# Patient Record
Sex: FEMALE | Race: WHITE | NOT HISPANIC OR LATINO
[De-identification: names, ages, dates, MRNs, and addresses within clinical notes are randomized per-mention and may not be internally consistent; named-entity substitution may affect disease eponyms.]

---

## 2019-12-06 ENCOUNTER — RESULT REVIEW (OUTPATIENT)
Age: 29
End: 2019-12-06

## 2021-08-17 ENCOUNTER — APPOINTMENT (OUTPATIENT)
Dept: ANTEPARTUM | Facility: CLINIC | Age: 31
End: 2021-08-17
Payer: COMMERCIAL

## 2021-08-17 ENCOUNTER — ASOB RESULT (OUTPATIENT)
Age: 31
End: 2021-08-17

## 2021-08-17 PROCEDURE — 76801 OB US < 14 WKS SINGLE FETUS: CPT

## 2021-08-17 PROCEDURE — 76813 OB US NUCHAL MEAS 1 GEST: CPT

## 2021-10-11 ENCOUNTER — APPOINTMENT (OUTPATIENT)
Dept: ANTEPARTUM | Facility: CLINIC | Age: 31
End: 2021-10-11
Payer: COMMERCIAL

## 2021-10-11 ENCOUNTER — ASOB RESULT (OUTPATIENT)
Age: 31
End: 2021-10-11

## 2021-10-11 PROCEDURE — 76817 TRANSVAGINAL US OBSTETRIC: CPT | Mod: 59

## 2021-10-11 PROCEDURE — 76805 OB US >/= 14 WKS SNGL FETUS: CPT

## 2021-10-22 ENCOUNTER — ASOB RESULT (OUTPATIENT)
Age: 31
End: 2021-10-22

## 2021-10-22 ENCOUNTER — APPOINTMENT (OUTPATIENT)
Dept: ANTEPARTUM | Facility: CLINIC | Age: 31
End: 2021-10-22
Payer: COMMERCIAL

## 2021-10-22 PROCEDURE — 76816 OB US FOLLOW-UP PER FETUS: CPT

## 2021-12-28 ENCOUNTER — ASOB RESULT (OUTPATIENT)
Age: 31
End: 2021-12-28

## 2021-12-28 ENCOUNTER — APPOINTMENT (OUTPATIENT)
Dept: ANTEPARTUM | Facility: CLINIC | Age: 31
End: 2021-12-28
Payer: COMMERCIAL

## 2021-12-28 PROCEDURE — 76819 FETAL BIOPHYS PROFIL W/O NST: CPT

## 2021-12-28 PROCEDURE — 76816 OB US FOLLOW-UP PER FETUS: CPT

## 2022-03-01 ENCOUNTER — INPATIENT (INPATIENT)
Facility: HOSPITAL | Age: 32
LOS: 3 days | Discharge: HOME | End: 2022-03-05
Attending: SPECIALIST | Admitting: SPECIALIST
Payer: COMMERCIAL

## 2022-03-01 ENCOUNTER — RESULT REVIEW (OUTPATIENT)
Age: 32
End: 2022-03-01

## 2022-03-01 VITALS — HEIGHT: 64 IN | WEIGHT: 147.05 LBS

## 2022-03-01 DIAGNOSIS — O26.899 OTHER SPECIFIED PREGNANCY RELATED CONDITIONS, UNSPECIFIED TRIMESTER: ICD-10-CM

## 2022-03-01 DIAGNOSIS — Z3A.00 WEEKS OF GESTATION OF PREGNANCY NOT SPECIFIED: ICD-10-CM

## 2022-03-01 LAB
ALBUMIN SERPL ELPH-MCNC: 3.6 G/DL — SIGNIFICANT CHANGE UP (ref 3.3–5)
ALP SERPL-CCNC: 298 U/L — HIGH (ref 40–120)
ALT FLD-CCNC: 13 U/L — SIGNIFICANT CHANGE UP (ref 10–45)
ANION GAP SERPL CALC-SCNC: 15 MMOL/L — SIGNIFICANT CHANGE UP (ref 5–17)
APTT BLD: 25.2 SEC — LOW (ref 27.5–35.5)
AST SERPL-CCNC: 22 U/L — SIGNIFICANT CHANGE UP (ref 10–40)
BASOPHILS # BLD AUTO: 0.02 K/UL — SIGNIFICANT CHANGE UP (ref 0–0.2)
BASOPHILS NFR BLD AUTO: 0.2 % — SIGNIFICANT CHANGE UP (ref 0–2)
BILIRUB SERPL-MCNC: 0.2 MG/DL — SIGNIFICANT CHANGE UP (ref 0.2–1.2)
BUN SERPL-MCNC: 8 MG/DL — SIGNIFICANT CHANGE UP (ref 7–23)
CALCIUM SERPL-MCNC: 9.7 MG/DL — SIGNIFICANT CHANGE UP (ref 8.4–10.5)
CHLORIDE SERPL-SCNC: 102 MMOL/L — SIGNIFICANT CHANGE UP (ref 96–108)
CO2 SERPL-SCNC: 17 MMOL/L — LOW (ref 22–31)
COVID-19 SPIKE DOMAIN AB INTERP: POSITIVE
COVID-19 SPIKE DOMAIN ANTIBODY RESULT: >250 U/ML — HIGH
CREAT ?TM UR-MCNC: 32 MG/DL — SIGNIFICANT CHANGE UP
CREAT SERPL-MCNC: 0.69 MG/DL — SIGNIFICANT CHANGE UP (ref 0.5–1.3)
EGFR: 117 ML/MIN/1.73M2 — SIGNIFICANT CHANGE UP
EOSINOPHIL # BLD AUTO: 0.02 K/UL — SIGNIFICANT CHANGE UP (ref 0–0.5)
EOSINOPHIL NFR BLD AUTO: 0.2 % — SIGNIFICANT CHANGE UP (ref 0–6)
FIBRINOGEN PPP-MCNC: 436 MG/DL — SIGNIFICANT CHANGE UP (ref 258–438)
GLUCOSE SERPL-MCNC: 80 MG/DL — SIGNIFICANT CHANGE UP (ref 70–99)
HCT VFR BLD CALC: 38.9 % — SIGNIFICANT CHANGE UP (ref 34.5–45)
HGB BLD-MCNC: 13.3 G/DL — SIGNIFICANT CHANGE UP (ref 11.5–15.5)
IMM GRANULOCYTES NFR BLD AUTO: 0.3 % — SIGNIFICANT CHANGE UP (ref 0–1.5)
INR BLD: 0.83 — LOW (ref 0.88–1.16)
LDH SERPL L TO P-CCNC: 241 U/L — SIGNIFICANT CHANGE UP (ref 50–242)
LYMPHOCYTES # BLD AUTO: 1.47 K/UL — SIGNIFICANT CHANGE UP (ref 1–3.3)
LYMPHOCYTES # BLD AUTO: 14.7 % — SIGNIFICANT CHANGE UP (ref 13–44)
MCHC RBC-ENTMCNC: 31.6 PG — SIGNIFICANT CHANGE UP (ref 27–34)
MCHC RBC-ENTMCNC: 34.2 GM/DL — SIGNIFICANT CHANGE UP (ref 32–36)
MCV RBC AUTO: 92.4 FL — SIGNIFICANT CHANGE UP (ref 80–100)
MONOCYTES # BLD AUTO: 0.41 K/UL — SIGNIFICANT CHANGE UP (ref 0–0.9)
MONOCYTES NFR BLD AUTO: 4.1 % — SIGNIFICANT CHANGE UP (ref 2–14)
NEUTROPHILS # BLD AUTO: 8.03 K/UL — HIGH (ref 1.8–7.4)
NEUTROPHILS NFR BLD AUTO: 80.5 % — HIGH (ref 43–77)
NRBC # BLD: 0 /100 WBCS — SIGNIFICANT CHANGE UP (ref 0–0)
PLATELET # BLD AUTO: 213 K/UL — SIGNIFICANT CHANGE UP (ref 150–400)
POTASSIUM SERPL-MCNC: 4.1 MMOL/L — SIGNIFICANT CHANGE UP (ref 3.5–5.3)
POTASSIUM SERPL-SCNC: 4.1 MMOL/L — SIGNIFICANT CHANGE UP (ref 3.5–5.3)
PROT ?TM UR-MCNC: 17 MG/DL — HIGH (ref 0–12)
PROT SERPL-MCNC: 6.8 G/DL — SIGNIFICANT CHANGE UP (ref 6–8.3)
PROT/CREAT UR-RTO: 0.5 RATIO — HIGH (ref 0–0.2)
PROTHROM AB SERPL-ACNC: 9.9 SEC — LOW (ref 10.5–13.4)
RBC # BLD: 4.21 M/UL — SIGNIFICANT CHANGE UP (ref 3.8–5.2)
RBC # FLD: 12.7 % — SIGNIFICANT CHANGE UP (ref 10.3–14.5)
RH IG SCN BLD-IMP: NEGATIVE — SIGNIFICANT CHANGE UP
SARS-COV-2 IGG+IGM SERPL QL IA: >250 U/ML — HIGH
SARS-COV-2 IGG+IGM SERPL QL IA: POSITIVE
SARS-COV-2 RNA SPEC QL NAA+PROBE: SIGNIFICANT CHANGE UP
SODIUM SERPL-SCNC: 134 MMOL/L — LOW (ref 135–145)
T PALLIDUM AB TITR SER: NEGATIVE — SIGNIFICANT CHANGE UP
URATE SERPL-MCNC: 6.2 MG/DL — SIGNIFICANT CHANGE UP (ref 2.5–7)
WBC # BLD: 9.98 K/UL — SIGNIFICANT CHANGE UP (ref 3.8–10.5)
WBC # FLD AUTO: 9.98 K/UL — SIGNIFICANT CHANGE UP (ref 3.8–10.5)

## 2022-03-01 PROCEDURE — 86077 PHYS BLOOD BANK SERV XMATCH: CPT

## 2022-03-01 RX ORDER — SODIUM CHLORIDE 9 MG/ML
1000 INJECTION, SOLUTION INTRAVENOUS
Refills: 0 | Status: DISCONTINUED | OUTPATIENT
Start: 2022-03-01 | End: 2022-03-02

## 2022-03-01 RX ORDER — OXYTOCIN 10 UNIT/ML
333.33 VIAL (ML) INJECTION
Qty: 20 | Refills: 0 | Status: DISCONTINUED | OUTPATIENT
Start: 2022-03-01 | End: 2022-03-02

## 2022-03-01 RX ORDER — FENTANYL/BUPIVACAINE/NS/PF 2MCG/ML-.1
250 PLASTIC BAG, INJECTION (ML) INJECTION
Refills: 0 | Status: DISCONTINUED | OUTPATIENT
Start: 2022-03-01 | End: 2022-03-02

## 2022-03-01 RX ORDER — OXYTOCIN 10 UNIT/ML
1 VIAL (ML) INJECTION
Qty: 30 | Refills: 0 | Status: DISCONTINUED | OUTPATIENT
Start: 2022-03-01 | End: 2022-03-02

## 2022-03-01 RX ORDER — CITRIC ACID/SODIUM CITRATE 300-500 MG
15 SOLUTION, ORAL ORAL ONCE
Refills: 0 | Status: DISCONTINUED | OUTPATIENT
Start: 2022-03-01 | End: 2022-03-02

## 2022-03-01 RX ADMIN — Medication 1 MILLIUNIT(S)/MIN: at 20:07

## 2022-03-01 RX ADMIN — SODIUM CHLORIDE 125 MILLILITER(S): 9 INJECTION, SOLUTION INTRAVENOUS at 11:17

## 2022-03-01 RX ADMIN — Medication 1 MILLIUNIT(S)/MIN: at 13:10

## 2022-03-02 ENCOUNTER — APPOINTMENT (OUTPATIENT)
Dept: ANTEPARTUM | Facility: CLINIC | Age: 32
End: 2022-03-02

## 2022-03-02 LAB — KLEIHAUER-BETKE CALCULATION: 0 % — SIGNIFICANT CHANGE UP (ref 0–0.3)

## 2022-03-02 PROCEDURE — 88307 TISSUE EXAM BY PATHOLOGIST: CPT | Mod: 26

## 2022-03-02 RX ORDER — BENZOCAINE 10 %
1 GEL (GRAM) MUCOUS MEMBRANE EVERY 6 HOURS
Refills: 0 | Status: DISCONTINUED | OUTPATIENT
Start: 2022-03-02 | End: 2022-03-05

## 2022-03-02 RX ORDER — KETOROLAC TROMETHAMINE 30 MG/ML
30 SYRINGE (ML) INJECTION ONCE
Refills: 0 | Status: DISCONTINUED | OUTPATIENT
Start: 2022-03-02 | End: 2022-03-02

## 2022-03-02 RX ORDER — CEFAZOLIN SODIUM 1 G
2000 VIAL (EA) INJECTION ONCE
Refills: 0 | Status: COMPLETED | OUTPATIENT
Start: 2022-03-02 | End: 2022-03-02

## 2022-03-02 RX ORDER — IBUPROFEN 200 MG
600 TABLET ORAL EVERY 6 HOURS
Refills: 0 | Status: COMPLETED | OUTPATIENT
Start: 2022-03-02 | End: 2023-01-29

## 2022-03-02 RX ORDER — OXYTOCIN 10 UNIT/ML
333.33 VIAL (ML) INJECTION
Qty: 20 | Refills: 0 | Status: DISCONTINUED | OUTPATIENT
Start: 2022-03-02 | End: 2022-03-05

## 2022-03-02 RX ORDER — PRAMOXINE HYDROCHLORIDE 150 MG/15G
1 AEROSOL, FOAM RECTAL EVERY 4 HOURS
Refills: 0 | Status: DISCONTINUED | OUTPATIENT
Start: 2022-03-02 | End: 2022-03-05

## 2022-03-02 RX ORDER — HYDROCORTISONE 1 %
1 OINTMENT (GRAM) TOPICAL EVERY 6 HOURS
Refills: 0 | Status: DISCONTINUED | OUTPATIENT
Start: 2022-03-02 | End: 2022-03-05

## 2022-03-02 RX ORDER — SODIUM CHLORIDE 9 MG/ML
3 INJECTION INTRAMUSCULAR; INTRAVENOUS; SUBCUTANEOUS EVERY 8 HOURS
Refills: 0 | Status: DISCONTINUED | OUTPATIENT
Start: 2022-03-02 | End: 2022-03-05

## 2022-03-02 RX ORDER — LANOLIN
1 OINTMENT (GRAM) TOPICAL EVERY 6 HOURS
Refills: 0 | Status: DISCONTINUED | OUTPATIENT
Start: 2022-03-02 | End: 2022-03-05

## 2022-03-02 RX ORDER — TETANUS TOXOID, REDUCED DIPHTHERIA TOXOID AND ACELLULAR PERTUSSIS VACCINE, ADSORBED 5; 2.5; 8; 8; 2.5 [IU]/.5ML; [IU]/.5ML; UG/.5ML; UG/.5ML; UG/.5ML
0.5 SUSPENSION INTRAMUSCULAR ONCE
Refills: 0 | Status: DISCONTINUED | OUTPATIENT
Start: 2022-03-02 | End: 2022-03-05

## 2022-03-02 RX ORDER — AER TRAVELER 0.5 G/1
1 SOLUTION RECTAL; TOPICAL EVERY 4 HOURS
Refills: 0 | Status: DISCONTINUED | OUTPATIENT
Start: 2022-03-02 | End: 2022-03-05

## 2022-03-02 RX ORDER — DIPHENHYDRAMINE HCL 50 MG
25 CAPSULE ORAL EVERY 6 HOURS
Refills: 0 | Status: DISCONTINUED | OUTPATIENT
Start: 2022-03-02 | End: 2022-03-05

## 2022-03-02 RX ORDER — ACETAMINOPHEN 500 MG
975 TABLET ORAL
Refills: 0 | Status: DISCONTINUED | OUTPATIENT
Start: 2022-03-02 | End: 2022-03-05

## 2022-03-02 RX ORDER — DIBUCAINE 1 %
1 OINTMENT (GRAM) RECTAL EVERY 6 HOURS
Refills: 0 | Status: DISCONTINUED | OUTPATIENT
Start: 2022-03-02 | End: 2022-03-05

## 2022-03-02 RX ORDER — MAGNESIUM HYDROXIDE 400 MG/1
30 TABLET, CHEWABLE ORAL
Refills: 0 | Status: DISCONTINUED | OUTPATIENT
Start: 2022-03-02 | End: 2022-03-05

## 2022-03-02 RX ORDER — SIMETHICONE 80 MG/1
80 TABLET, CHEWABLE ORAL EVERY 4 HOURS
Refills: 0 | Status: DISCONTINUED | OUTPATIENT
Start: 2022-03-02 | End: 2022-03-05

## 2022-03-02 RX ORDER — IBUPROFEN 200 MG
600 TABLET ORAL EVERY 6 HOURS
Refills: 0 | Status: DISCONTINUED | OUTPATIENT
Start: 2022-03-02 | End: 2022-03-05

## 2022-03-02 RX ADMIN — Medication 100 MILLIGRAM(S): at 03:00

## 2022-03-02 RX ADMIN — Medication 30 MILLIGRAM(S): at 04:05

## 2022-03-02 RX ADMIN — SODIUM CHLORIDE 3 MILLILITER(S): 9 INJECTION INTRAMUSCULAR; INTRAVENOUS; SUBCUTANEOUS at 14:00

## 2022-03-02 RX ADMIN — Medication 600 MILLIGRAM(S): at 19:38

## 2022-03-02 RX ADMIN — SODIUM CHLORIDE 3 MILLILITER(S): 9 INJECTION INTRAMUSCULAR; INTRAVENOUS; SUBCUTANEOUS at 21:45

## 2022-03-02 RX ADMIN — Medication 600 MILLIGRAM(S): at 13:04

## 2022-03-02 RX ADMIN — Medication 975 MILLIGRAM(S): at 22:00

## 2022-03-02 RX ADMIN — Medication 30 MILLIGRAM(S): at 02:56

## 2022-03-02 RX ADMIN — Medication 1 TABLET(S): at 13:04

## 2022-03-02 RX ADMIN — Medication 975 MILLIGRAM(S): at 21:21

## 2022-03-02 RX ADMIN — Medication 600 MILLIGRAM(S): at 14:00

## 2022-03-02 RX ADMIN — Medication 600 MILLIGRAM(S): at 18:38

## 2022-03-02 NOTE — LACTATION INITIAL EVALUATION - NS LACT CON REASON FOR REQ
Mother reports recent 15min prior to my visit to room. Baby sleepy but mother would like positioning assistance. Attempts made but baby remains sleepy and unwilling. Reassurance provided and mother advised to remain skin to skin with baby. Manual expression taught, colostral drops expressible bilaterally and placed on babies lips. Complete breastfeeding education and normal  behavior explained. Upon oral eval, anterior lingual frenulum noted, consistent with a type 2 tie. Implications fully discussed and parents advised to speak with peds for further guidance as needed. To f/u. All questions were answered, mother verbalized understanding of teaching and info given./primaparous mom

## 2022-03-03 RX ADMIN — Medication 1 TABLET(S): at 12:12

## 2022-03-03 RX ADMIN — SODIUM CHLORIDE 3 MILLILITER(S): 9 INJECTION INTRAMUSCULAR; INTRAVENOUS; SUBCUTANEOUS at 07:16

## 2022-03-03 RX ADMIN — Medication 600 MILLIGRAM(S): at 13:12

## 2022-03-03 RX ADMIN — SODIUM CHLORIDE 3 MILLILITER(S): 9 INJECTION INTRAMUSCULAR; INTRAVENOUS; SUBCUTANEOUS at 22:09

## 2022-03-03 RX ADMIN — Medication 600 MILLIGRAM(S): at 06:58

## 2022-03-03 RX ADMIN — Medication 600 MILLIGRAM(S): at 19:42

## 2022-03-03 RX ADMIN — Medication 975 MILLIGRAM(S): at 04:17

## 2022-03-03 RX ADMIN — Medication 975 MILLIGRAM(S): at 09:50

## 2022-03-03 RX ADMIN — Medication 975 MILLIGRAM(S): at 05:04

## 2022-03-03 RX ADMIN — Medication 600 MILLIGRAM(S): at 18:42

## 2022-03-03 RX ADMIN — Medication 600 MILLIGRAM(S): at 23:50

## 2022-03-03 RX ADMIN — Medication 975 MILLIGRAM(S): at 20:44

## 2022-03-03 RX ADMIN — Medication 1 APPLICATION(S): at 01:33

## 2022-03-03 RX ADMIN — Medication 600 MILLIGRAM(S): at 07:35

## 2022-03-03 RX ADMIN — Medication 600 MILLIGRAM(S): at 01:33

## 2022-03-03 RX ADMIN — Medication 975 MILLIGRAM(S): at 10:50

## 2022-03-03 RX ADMIN — Medication 600 MILLIGRAM(S): at 02:05

## 2022-03-03 RX ADMIN — Medication 600 MILLIGRAM(S): at 12:12

## 2022-03-03 RX ADMIN — SODIUM CHLORIDE 3 MILLILITER(S): 9 INJECTION INTRAMUSCULAR; INTRAVENOUS; SUBCUTANEOUS at 14:00

## 2022-03-03 RX ADMIN — Medication 975 MILLIGRAM(S): at 21:44

## 2022-03-04 ENCOUNTER — TRANSCRIPTION ENCOUNTER (OUTPATIENT)
Age: 32
End: 2022-03-04

## 2022-03-04 LAB
ALBUMIN SERPL ELPH-MCNC: 3.3 G/DL — SIGNIFICANT CHANGE UP (ref 3.3–5)
ALP SERPL-CCNC: 185 U/L — HIGH (ref 40–120)
ALT FLD-CCNC: 15 U/L — SIGNIFICANT CHANGE UP (ref 10–45)
ANION GAP SERPL CALC-SCNC: 13 MMOL/L — SIGNIFICANT CHANGE UP (ref 5–17)
AST SERPL-CCNC: 35 U/L — SIGNIFICANT CHANGE UP (ref 10–40)
BASOPHILS # BLD AUTO: 0.02 K/UL — SIGNIFICANT CHANGE UP (ref 0–0.2)
BASOPHILS NFR BLD AUTO: 0.2 % — SIGNIFICANT CHANGE UP (ref 0–2)
BILIRUB SERPL-MCNC: 0.2 MG/DL — SIGNIFICANT CHANGE UP (ref 0.2–1.2)
BUN SERPL-MCNC: 9 MG/DL — SIGNIFICANT CHANGE UP (ref 7–23)
CALCIUM SERPL-MCNC: 9.4 MG/DL — SIGNIFICANT CHANGE UP (ref 8.4–10.5)
CHLORIDE SERPL-SCNC: 101 MMOL/L — SIGNIFICANT CHANGE UP (ref 96–108)
CO2 SERPL-SCNC: 24 MMOL/L — SIGNIFICANT CHANGE UP (ref 22–31)
CREAT SERPL-MCNC: 0.79 MG/DL — SIGNIFICANT CHANGE UP (ref 0.5–1.3)
EGFR: 101 ML/MIN/1.73M2 — SIGNIFICANT CHANGE UP
EOSINOPHIL # BLD AUTO: 0.04 K/UL — SIGNIFICANT CHANGE UP (ref 0–0.5)
EOSINOPHIL NFR BLD AUTO: 0.3 % — SIGNIFICANT CHANGE UP (ref 0–6)
GLUCOSE SERPL-MCNC: 95 MG/DL — SIGNIFICANT CHANGE UP (ref 70–99)
HCT VFR BLD CALC: 29.9 % — LOW (ref 34.5–45)
HGB BLD-MCNC: 10.5 G/DL — LOW (ref 11.5–15.5)
IMM GRANULOCYTES NFR BLD AUTO: 0.3 % — SIGNIFICANT CHANGE UP (ref 0–1.5)
LDH SERPL L TO P-CCNC: 253 U/L — HIGH (ref 50–242)
LYMPHOCYTES # BLD AUTO: 17.7 % — SIGNIFICANT CHANGE UP (ref 13–44)
LYMPHOCYTES # BLD AUTO: 2.07 K/UL — SIGNIFICANT CHANGE UP (ref 1–3.3)
MCHC RBC-ENTMCNC: 32.9 PG — SIGNIFICANT CHANGE UP (ref 27–34)
MCHC RBC-ENTMCNC: 35.1 GM/DL — SIGNIFICANT CHANGE UP (ref 32–36)
MCV RBC AUTO: 93.7 FL — SIGNIFICANT CHANGE UP (ref 80–100)
MONOCYTES # BLD AUTO: 0.36 K/UL — SIGNIFICANT CHANGE UP (ref 0–0.9)
MONOCYTES NFR BLD AUTO: 3.1 % — SIGNIFICANT CHANGE UP (ref 2–14)
NEUTROPHILS # BLD AUTO: 9.16 K/UL — HIGH (ref 1.8–7.4)
NEUTROPHILS NFR BLD AUTO: 78.4 % — HIGH (ref 43–77)
NRBC # BLD: 0 /100 WBCS — SIGNIFICANT CHANGE UP (ref 0–0)
PLATELET # BLD AUTO: 241 K/UL — SIGNIFICANT CHANGE UP (ref 150–400)
POTASSIUM SERPL-MCNC: 4 MMOL/L — SIGNIFICANT CHANGE UP (ref 3.5–5.3)
POTASSIUM SERPL-SCNC: 4 MMOL/L — SIGNIFICANT CHANGE UP (ref 3.5–5.3)
PROT SERPL-MCNC: 6.1 G/DL — SIGNIFICANT CHANGE UP (ref 6–8.3)
RBC # BLD: 3.19 M/UL — LOW (ref 3.8–5.2)
RBC # FLD: 13.2 % — SIGNIFICANT CHANGE UP (ref 10.3–14.5)
SODIUM SERPL-SCNC: 138 MMOL/L — SIGNIFICANT CHANGE UP (ref 135–145)
URATE SERPL-MCNC: 5.5 MG/DL — SIGNIFICANT CHANGE UP (ref 2.5–7)
WBC # BLD: 11.69 K/UL — HIGH (ref 3.8–10.5)
WBC # FLD AUTO: 11.69 K/UL — HIGH (ref 3.8–10.5)

## 2022-03-04 RX ORDER — IBUPROFEN 200 MG
1 TABLET ORAL
Qty: 0 | Refills: 0 | DISCHARGE
Start: 2022-03-04

## 2022-03-04 RX ORDER — NIFEDIPINE 30 MG
30 TABLET, EXTENDED RELEASE 24 HR ORAL EVERY 12 HOURS
Refills: 0 | Status: DISCONTINUED | OUTPATIENT
Start: 2022-03-04 | End: 2022-03-05

## 2022-03-04 RX ORDER — ACETAMINOPHEN 500 MG
3 TABLET ORAL
Qty: 0 | Refills: 0 | DISCHARGE
Start: 2022-03-04

## 2022-03-04 RX ORDER — NIFEDIPINE 30 MG
30 TABLET, EXTENDED RELEASE 24 HR ORAL EVERY 24 HOURS
Refills: 0 | Status: DISCONTINUED | OUTPATIENT
Start: 2022-03-04 | End: 2022-03-04

## 2022-03-04 RX ADMIN — SODIUM CHLORIDE 3 MILLILITER(S): 9 INJECTION INTRAMUSCULAR; INTRAVENOUS; SUBCUTANEOUS at 06:05

## 2022-03-04 RX ADMIN — Medication 600 MILLIGRAM(S): at 13:47

## 2022-03-04 RX ADMIN — Medication 30 MILLIGRAM(S): at 12:48

## 2022-03-04 RX ADMIN — Medication 600 MILLIGRAM(S): at 06:58

## 2022-03-04 RX ADMIN — Medication 600 MILLIGRAM(S): at 20:41

## 2022-03-04 RX ADMIN — Medication 600 MILLIGRAM(S): at 05:58

## 2022-03-04 RX ADMIN — Medication 600 MILLIGRAM(S): at 00:50

## 2022-03-04 RX ADMIN — Medication 600 MILLIGRAM(S): at 12:47

## 2022-03-04 RX ADMIN — Medication 600 MILLIGRAM(S): at 19:50

## 2022-03-04 RX ADMIN — Medication 1 TABLET(S): at 19:50

## 2022-03-04 RX ADMIN — SODIUM CHLORIDE 3 MILLILITER(S): 9 INJECTION INTRAMUSCULAR; INTRAVENOUS; SUBCUTANEOUS at 13:56

## 2022-03-04 RX ADMIN — SODIUM CHLORIDE 3 MILLILITER(S): 9 INJECTION INTRAMUSCULAR; INTRAVENOUS; SUBCUTANEOUS at 23:28

## 2022-03-04 RX ADMIN — Medication 30 MILLIGRAM(S): at 22:11

## 2022-03-04 NOTE — DISCHARGE NOTE OB - CARE PLAN
Principal Discharge DX:	Postpartum state  Assessment and plan of treatment:	Vaginal delivery, meeting all postpartum milestones.  Please follow-up with your OB doctor within 1-2 weeks.  You can resume a regular diet at home and may continue your prenatal vitamins as directed.  Please place nothing in the vagina for 6 weeks (no tampons, sex, douching, tub baths, swimming pools, etc).  If you have severe headaches and/or vision changes, heavy bleeding, or chest pain, please call your provider or go to the nearest Emergency Department.  Please call your OB with any signs of symptoms of infection including fever > 100.4 degrees, severe pain, malodorous vaginal discharge or heavy bleeding requiring more than 1-2 pads/hour.  You can take Motrin 600mg orally every 6 hours/ Tylenol 1000 mg every 6 hours for pain as needed.  Secondary Diagnosis:	Preeclampsia, third trimester  Assessment and plan of treatment:	Follow up within your OB in one week for a blood pressure check. Check bp 3x a day. If blood pressure greater than 160/110, you develop a headache not relieved by tylenol, visual disturbances, or right upper abdominal pain, call your doctor or the hospital, or go to your nearest emergency room. Regular diet, normal activity, nothing in the vagina for 6 weeks--no sex, tampons, tub baths, or swimming pools.   1 Principal Discharge DX:	Postpartum state  Assessment and plan of treatment:	Vaginal delivery, meeting all postpartum milestones.  Please follow-up with your OB doctor within 1-2 weeks.  You can resume a regular diet at home and may continue your prenatal vitamins as directed.  Please place nothing in the vagina for 6 weeks (no tampons, sex, douching, tub baths, swimming pools, etc).  If you have severe headaches and/or vision changes, heavy bleeding, or chest pain, please call your provider or go to the nearest Emergency Department.  Please call your OB with any signs of symptoms of infection including fever > 100.4 degrees, severe pain, malodorous vaginal discharge or heavy bleeding requiring more than 1-2 pads/hour.  You can take Motrin 600mg orally every 6 hours/ Tylenol 1000 mg every 6 hours for pain as needed.  Secondary Diagnosis:	Preeclampsia, third trimester  Assessment and plan of treatment:	Follow up within your OB in one week for a blood pressure check. Check bp 2x a day. If blood pressure greater than 160/110, you develop a headache not relieved by tylenol, visual disturbances, or right upper abdominal pain, call your doctor or the hospital, or go to your nearest emergency room. Regular diet, normal activity, nothing in the vagina for 6 weeks--no sex, tampons, tub baths, or swimming pools.

## 2022-03-04 NOTE — CHART NOTE - NSCHARTNOTEFT_GEN_A_CORE
30yo  s/p  c/b PEC without SF currently PP#2 evaluated for severe range BP.  BPs this morning were 143/91, 156/84 so the decision was made with Dr. Ayon to start Nifedipine 30mg daily and draw PEC labs.  Next BP was severe range at 160/91.  The Nifedipine 30mg XL was given STAT and repeat BP 15min later was 147/96.  Patient is asymptomatic; denies headache, blurry vision, scotoma, SOB, RUQ/epigastric pain, N/V.    BP range 131-160/75-96  Gen: patient well-appearing in NAD  Lungs: CTAB  Cardiac: RRR  Abd: soft, NT/ND  Ext: no LE edema    CBC wnl:                      10.5   11.69 )-----------( 241      ( 04 Mar 2022 13:10 )             29.9     CMP wnl:  138  |  101  |  9   ----------------------------<  95  4.0   |  24  |  0.79    Ca    9.4      04 Mar 2022 13:10    TPro  6.1  /  Alb  3.3  /  TBili  0.2  /  DBili  x   /  AST  35  /  ALT  15  /  AlkPhos  185<H>  03-04    Uric Acid 5.5            A/P: 30yo  s/p  c/b PEC without SF currently PP#2 with one severe range BP this afternoon --> patient asymptomatic with normal labs  - Continue Nifedipine 30mg daily  - Monitor BPs q2hrs  - Monitor for toxic sxs  - Possible discharge this evening if BPs remain stable      Discussed with Dr. Ayon.

## 2022-03-04 NOTE — DISCHARGE NOTE OB - MEDICATION SUMMARY - MEDICATIONS TO TAKE
I will START or STAY ON the medications listed below when I get home from the hospital:    acetaminophen 325 mg oral tablet  -- 3 tab(s) by mouth every 6 hours  -- Indication: For Pain    ibuprofen 600 mg oral tablet  -- 1 tab(s) by mouth every 6 hours  -- Indication: For Pain   I will START or STAY ON the medications listed below when I get home from the hospital:    acetaminophen 325 mg oral tablet  -- 3 tab(s) by mouth every 6 hours  -- Indication: For Pain    ibuprofen 600 mg oral tablet  -- 1 tab(s) by mouth every 6 hours  -- Indication: For Pain    NIFEdipine 30 mg oral tablet, extended release  -- 1 tab(s) by mouth every 12 hours MDD:2 tabs  -- Indication: For HTN

## 2022-03-04 NOTE — DISCHARGE NOTE OB - NS MD DC FALL RISK RISK
For information on Fall & Injury Prevention, visit: [email or web address] OR  [email or web address] OR  https://www.cdc.gov/steadi/patient.html

## 2022-03-04 NOTE — DISCHARGE NOTE OB - PLAN OF CARE
Follow up within your OB in one week for a blood pressure check. Check bp 3x a day. If blood pressure greater than 160/110, you develop a headache not relieved by tylenol, visual disturbances, or right upper abdominal pain, call your doctor or the hospital, or go to your nearest emergency room. Regular diet, normal activity, nothing in the vagina for 6 weeks--no sex, tampons, tub baths, or swimming pools. Vaginal delivery, meeting all postpartum milestones.  Please follow-up with your OB doctor within 1-2 weeks.  You can resume a regular diet at home and may continue your prenatal vitamins as directed.  Please place nothing in the vagina for 6 weeks (no tampons, sex, douching, tub baths, swimming pools, etc).  If you have severe headaches and/or vision changes, heavy bleeding, or chest pain, please call your provider or go to the nearest Emergency Department.  Please call your OB with any signs of symptoms of infection including fever > 100.4 degrees, severe pain, malodorous vaginal discharge or heavy bleeding requiring more than 1-2 pads/hour.  You can take Motrin 600mg orally every 6 hours/ Tylenol 1000 mg every 6 hours for pain as needed. Follow up within your OB in one week for a blood pressure check. Check bp 2x a day. If blood pressure greater than 160/110, you develop a headache not relieved by tylenol, visual disturbances, or right upper abdominal pain, call your doctor or the hospital, or go to your nearest emergency room. Regular diet, normal activity, nothing in the vagina for 6 weeks--no sex, tampons, tub baths, or swimming pools.

## 2022-03-04 NOTE — DISCHARGE NOTE OB - CARE PROVIDER_API CALL
Sean Ayon)  Obstetrics and Gynecology  [address] [place], [place] [address]  Phone: [phone]  Fax: [phone]  Follow Up Time:

## 2022-03-04 NOTE — DISCHARGE NOTE OB - HOSPITAL COURSE
PEC w/o SF, manual placental removal, no PPh. No hypertensive meds/mag needed.  ncomplicated hospital course. Patient stable at time of discharge. Patient ambulating and tolerating PO. Met all postpartum milestones. PEC w/o SF, manual placental removal, no PPh. No magnesium needed. Started on Nifedipine 30mg BID on postpartum day #3. on PP #4 BPs mild range. Patient stable at time of discharge. Patient ambulating and tolerating PO. Met all postpartum milestones.

## 2022-03-04 NOTE — DISCHARGE NOTE OB - MATERIALS PROVIDED
[facility] [place] Screening Program/[place]  Immunization Record/Breastfeeding Log/Guide to Postpartum Care/[facility] Hearing Screen Program/Back To Sleep Handout/Breastfeeding Guide and Packet/Birth Certificate Instructions

## 2022-03-04 NOTE — DISCHARGE NOTE OB - PATIENT PORTAL LINK FT
You can access the FollowMyHealth Patient Portal offered by [facility] by registering at the following website: [email or web address]. By joining [employer]’s FollowMyHealth portal, you will also be able to view your health information using other applications (apps) compatible with our system.

## 2022-03-05 VITALS
DIASTOLIC BLOOD PRESSURE: 87 MMHG | RESPIRATION RATE: 18 BRPM | SYSTOLIC BLOOD PRESSURE: 136 MMHG | TEMPERATURE: 98 F | HEART RATE: 81 BPM | OXYGEN SATURATION: 98 %

## 2022-03-05 PROCEDURE — 86769 SARS-COV-2 COVID-19 ANTIBODY: CPT

## 2022-03-05 PROCEDURE — 85610 PROTHROMBIN TIME: CPT

## 2022-03-05 PROCEDURE — 85460 HEMOGLOBIN FETAL: CPT

## 2022-03-05 PROCEDURE — 84156 ASSAY OF PROTEIN URINE: CPT

## 2022-03-05 PROCEDURE — 86780 TREPONEMA PALLIDUM: CPT

## 2022-03-05 PROCEDURE — 86900 BLOOD TYPING SEROLOGIC ABO: CPT

## 2022-03-05 PROCEDURE — 88307 TISSUE EXAM BY PATHOLOGIST: CPT

## 2022-03-05 PROCEDURE — 85025 COMPLETE CBC W/AUTO DIFF WBC: CPT

## 2022-03-05 PROCEDURE — 99214 OFFICE O/P EST MOD 30 MIN: CPT

## 2022-03-05 PROCEDURE — 86850 RBC ANTIBODY SCREEN: CPT

## 2022-03-05 PROCEDURE — 86870 RBC ANTIBODY IDENTIFICATION: CPT

## 2022-03-05 PROCEDURE — 36415 COLL VENOUS BLD VENIPUNCTURE: CPT

## 2022-03-05 PROCEDURE — 83615 LACTATE (LD) (LDH) ENZYME: CPT

## 2022-03-05 PROCEDURE — 82570 ASSAY OF URINE CREATININE: CPT

## 2022-03-05 PROCEDURE — 80053 COMPREHEN METABOLIC PANEL: CPT

## 2022-03-05 PROCEDURE — 86880 COOMBS TEST DIRECT: CPT

## 2022-03-05 PROCEDURE — 85384 FIBRINOGEN ACTIVITY: CPT

## 2022-03-05 PROCEDURE — 85730 THROMBOPLASTIN TIME PARTIAL: CPT

## 2022-03-05 PROCEDURE — 86901 BLOOD TYPING SEROLOGIC RH(D): CPT

## 2022-03-05 PROCEDURE — 87635 SARS-COV-2 COVID-19 AMP PRB: CPT

## 2022-03-05 PROCEDURE — 84550 ASSAY OF BLOOD/URIC ACID: CPT

## 2022-03-05 RX ORDER — NIFEDIPINE 30 MG
1 TABLET, EXTENDED RELEASE 24 HR ORAL
Qty: 60 | Refills: 2
Start: 2022-03-05 | End: 2022-06-02

## 2022-03-05 RX ADMIN — Medication 600 MILLIGRAM(S): at 13:30

## 2022-03-05 RX ADMIN — SODIUM CHLORIDE 3 MILLILITER(S): 9 INJECTION INTRAMUSCULAR; INTRAVENOUS; SUBCUTANEOUS at 06:40

## 2022-03-05 RX ADMIN — Medication 600 MILLIGRAM(S): at 00:09

## 2022-03-05 RX ADMIN — Medication 30 MILLIGRAM(S): at 10:10

## 2022-03-05 RX ADMIN — Medication 600 MILLIGRAM(S): at 01:00

## 2022-03-05 RX ADMIN — Medication 600 MILLIGRAM(S): at 06:34

## 2022-03-05 RX ADMIN — Medication 600 MILLIGRAM(S): at 12:42

## 2022-03-05 RX ADMIN — Medication 1 TABLET(S): at 12:42

## 2022-03-05 RX ADMIN — Medication 600 MILLIGRAM(S): at 06:40

## 2022-03-05 NOTE — PROGRESS NOTE ADULT - ASSESSMENT
A/P 31y s/p  c/b manual placental extraction and PEC w/o SF, PPD#2 , stable, meeting postpartum milestones   - Manual placental extraction: s/p ancef x1 dose, afebrile  - PEC w/o SF: normotensive, no toxic complaints, VSq4  - Pain: well controlled on tylenol/motrin  - GI: Tolerating regular diet  - : urinating without difficulty/pain  - DVT prophylaxis: ambulating frequently  - Dispo: PPD 2, unless otherwise specified    
A/P 31y s/p , PPD#3 c/b PEC w/o SF , stable, meeting postpartum milestones   - PEC w/o SF  - Normal to mild range BP overnight  - No toxic complaints  - Cont nifedipine 30mg BID  - VSq4    - Pain: well controlled on tylenol/motrin  - GI: Tolerating regular diet  - : urinating without difficulty/pain  - DVT prophylaxis: ambulating frequently  - Dispo: pending BP stabilization    
A/P 31y s/p , PPD 1#  , stable, meeting postpartum milestones   - Pain: well controlled on OPM  - GI: Tolerating regular diet  - : urinating without difficulty/pain  -DVT prophylaxis: ambulating frequently  -Dispo: PPD 2, unless otherwise specified

## 2022-03-05 NOTE — PROGRESS NOTE ADULT - SUBJECTIVE AND OBJECTIVE BOX
Patient evaluated at bedside this morning, resting comfortable in bed, no acute events overnight.  She reports pain is well controlled with tylenol and motrin  She denies headache, dizziness, chest pain, palpitations, shortness of breath, nausea, vomiting, heavy vaginal bleeding or perineal discomfort. Reports decrease in amount of vaginal bleeding and denies clots.  She has been ambulating without assistance, voiding spontaneously.  Tolerating food well, without nausea/vomit.  Passing flatus.     Physical Exam:  T(C): 36.6 (03-03-22 @ 06:40), Max: 36.8 (03-03-22 @ 01:37)  HR: 59 (03-03-22 @ 06:40) (59 - 77)  BP: 147/71 (03-03-22 @ 06:40) (127/82 - 147/71)  RR: 18 (03-03-22 @ 06:40) (18 - 18)  SpO2: 97% (03-03-22 @ 06:40) (94% - 97%)    GA: NAD, A&O x 3  Abd: + BS, soft, nontender, nondistended, no rebound or guarding, uterus firm at midline and 2  fb below umbilicus  Perineum: normal lochia, intact, healing well, no hematoma  Extremities: no swelling or calf tenderness            acetaminophen     Tablet .. 975 milliGRAM(s) Oral <User Schedule>  benzocaine 20%/menthol 0.5% Spray 1 Spray(s) Topical every 6 hours PRN  dibucaine 1% Ointment 1 Application(s) Topical every 6 hours PRN  diphenhydrAMINE 25 milliGRAM(s) Oral every 6 hours PRN  diphtheria/tetanus/pertussis (acellular) Vaccine (ADAcel) 0.5 milliLiter(s) IntraMuscular once  hydrocortisone 1% Cream 1 Application(s) Topical every 6 hours PRN  ibuprofen  Tablet. 600 milliGRAM(s) Oral every 6 hours  lanolin Ointment 1 Application(s) Topical every 6 hours PRN  magnesium hydroxide Suspension 30 milliLiter(s) Oral two times a day PRN  oxytocin Infusion 333.333 milliUNIT(s)/Min IV Continuous <Continuous>  pramoxine 1%/zinc 5% Cream 1 Application(s) Topical every 4 hours PRN  prenatal multivitamin 1 Tablet(s) Oral daily  simethicone 80 milliGRAM(s) Chew every 4 hours PRN  sodium chloride 0.9% lock flush 3 milliLiter(s) IV Push every 8 hours  witch hazel Pads 1 Application(s) Topical every 4 hours PRN  
Patient seen and evaluated at bedside this morning, no acute events overnight.    She is lying comfortably in bed, reports pain is well controlled with tylenol and motrin. She has been ambulating without assistance, voiding spontaneously, and tolerating food.  Denies headache, vision changes/scotoma, dizziness, chest pain, palpitations, shortness of breath, RUQ/epigastric tenderness, nausea/vomiting, or heavy vaginal bleeding. Reports decrease in amount of vaginal bleeding and denies clots.    Physical Exam:  T(C): 36.6 (03-04-22 @ 02:16), Max: 36.7 (03-03-22 @ 18:16)  HR: 65 (03-04-22 @ 02:16) (63 - 66)  BP: 131/77 (03-04-22 @ 02:16) (124/78 - 131/77)  RR: 18 (03-04-22 @ 02:16) (18 - 18)  SpO2: 96% (03-04-22 @ 02:16) (96% - 97%)    GA: NAD, A&O x 3  CV: regular rate, normotensive  Pulm: no inc WOB  Abd: soft, NT/ND, no rebound or guarding, uterus firm at midline and 2  fb below umbilicus, no fundal tenderness  Perineum: normal lochia, intact, healing well  Extremities: mild pedal edema b/l, no calf tenderness            acetaminophen     Tablet .. 975 milliGRAM(s) Oral <User Schedule>  benzocaine 20%/menthol 0.5% Spray 1 Spray(s) Topical every 6 hours PRN  dibucaine 1% Ointment 1 Application(s) Topical every 6 hours PRN  diphenhydrAMINE 25 milliGRAM(s) Oral every 6 hours PRN  diphtheria/tetanus/pertussis (acellular) Vaccine (ADAcel) 0.5 milliLiter(s) IntraMuscular once  hydrocortisone 1% Cream 1 Application(s) Topical every 6 hours PRN  ibuprofen  Tablet. 600 milliGRAM(s) Oral every 6 hours  lanolin Ointment 1 Application(s) Topical every 6 hours PRN  magnesium hydroxide Suspension 30 milliLiter(s) Oral two times a day PRN  oxytocin Infusion 333.333 milliUNIT(s)/Min IV Continuous <Continuous>  pramoxine 1%/zinc 5% Cream 1 Application(s) Topical every 4 hours PRN  prenatal multivitamin 1 Tablet(s) Oral daily  simethicone 80 milliGRAM(s) Chew every 4 hours PRN  sodium chloride 0.9% lock flush 3 milliLiter(s) IV Push every 8 hours  witch hazel Pads 1 Application(s) Topical every 4 hours PRN  
Patient seen and evaluated at bedside this morning, no acute events overnight.    She is lying comfortably in bed, reports pain is well controlled with tylenol and motrin. She has been ambulating without assistance, voiding spontaneously, and tolerating food.  Denies headache, vision changes/scotoma, dizziness, chest pain, palpitations, shortness of breath, nausea/vomiting, RUQ/epigastric tenderness, or heavy vaginal bleeding. Reports decrease in amount of vaginal bleeding and denies clots.    Physical Exam:  T(C): 36.6 (03-05-22 @ 02:32), Max: 36.6 (03-04-22 @ 18:32)  HR: 67 (03-05-22 @ 02:32) (60 - 86)  BP: 144/91 (03-05-22 @ 02:32) (128/78 - 144/91)  RR: 18 (03-05-22 @ 02:32) (18 - 18)  SpO2: 97% (03-05-22 @ 02:32) (96% - 97%)    GA: NAD, A&O x 3  CV: regular rate, normal to mild range BP  Pulm: no inc WOB  Abd: soft, NT/ND, no rebound or guarding, uterus firm at midline and 2  fb below umbilicus, no RUQ/epigastric tenderness  Perineum: normal lochia, intact, healing well  Extremities: mild pedal edema b/l, no calf tenderness                          10.5   11.69 )-----------( 241      ( 04 Mar 2022 13:10 )             29.9     03-04    138  |  101  |  9   ----------------------------<  95  4.0   |  24  |  0.79    Ca    9.4      04 Mar 2022 13:10    TPro  6.1  /  Alb  3.3  /  TBili  0.2  /  DBili  x   /  AST  35  /  ALT  15  /  AlkPhos  185<H>  03-04    acetaminophen     Tablet .. 975 milliGRAM(s) Oral <User Schedule>  benzocaine 20%/menthol 0.5% Spray 1 Spray(s) Topical every 6 hours PRN  dibucaine 1% Ointment 1 Application(s) Topical every 6 hours PRN  diphenhydrAMINE 25 milliGRAM(s) Oral every 6 hours PRN  diphtheria/tetanus/pertussis (acellular) Vaccine (ADAcel) 0.5 milliLiter(s) IntraMuscular once  hydrocortisone 1% Cream 1 Application(s) Topical every 6 hours PRN  ibuprofen  Tablet. 600 milliGRAM(s) Oral every 6 hours  lanolin Ointment 1 Application(s) Topical every 6 hours PRN  magnesium hydroxide Suspension 30 milliLiter(s) Oral two times a day PRN  NIFEdipine XL 30 milliGRAM(s) Oral every 12 hours  oxytocin Infusion 333.333 milliUNIT(s)/Min IV Continuous <Continuous>  pramoxine 1%/zinc 5% Cream 1 Application(s) Topical every 4 hours PRN  prenatal multivitamin 1 Tablet(s) Oral daily  simethicone 80 milliGRAM(s) Chew every 4 hours PRN  sodium chloride 0.9% lock flush 3 milliLiter(s) IV Push every 8 hours  witch hazel Pads 1 Application(s) Topical every 4 hours PRN

## 2022-03-08 DIAGNOSIS — Z3A.40 40 WEEKS GESTATION OF PREGNANCY: ICD-10-CM

## 2022-03-15 NOTE — PATIENT PROFILE OB - WEIGHT IN LBS
1.  Please return to clinic at your next scheduled visit.  Contact the clinic ([phone]) at least 24 hours prior in the event you need to cancel.  2.  Do no harm to yourself or others.    3.  Avoid alcohol and drugs.    4.  Take all medications as prescribed.  Please contact the clinic with any concerns. If you are in need of medication refills, please call the clinic at [phone].    5. Should you want to get in touch with your provider, Dr. Vinny Krishnamurthy, please utilize [employer] or contact the office ([phone]), and staff will be able to page Dr. Krishnamurthy directly.  6.  In the event you have personal crisis, contact the following crisis numbers: Suicide Prevention Hotline 1-344.983.5625; [facility] Helpline 6-780-665-JHGR; [facility] Emergency Room [phone]; text HELLO to 694903; or 631.     SPECIFIC RECOMMENDATIONS:     1.      Medications discussed at this encounter:                   -Continue medications, increase Ambien     2.      Psychotherapy recommendations:      3.     Return to clinic: 1 weeks        147

## 2022-03-16 LAB — SURGICAL PATHOLOGY STUDY: SIGNIFICANT CHANGE UP

## 2022-07-21 NOTE — PATIENT PROFILE OB - BABY A: APGAR 5 MIN
LABS:                           12.4   6.11  )-----------( 186      ( 2022 09:23 )             38.7         143  |  103  |  21  ----------------------------<  132<H>  4.1   |  25  |  1.23    Ca    9.0      2022 09:23    TPro  7.7  /  Alb  3.9  /  TBili  0.9  /  DBili  x   /  AST  28  /  ALT  146<H>  /  AlkPhos  373<H>          PT/INR - ( 2022 09:23 )   PT: 12.0 sec;   INR: 1.03 ratio         PTT - ( 2022 09:23 )  PTT:29.6 sec      Urinalysis Basic - ( 2022 11:59 )    Color: Light Yellow / Appearance: Clear / S.033 / pH: x  Gluc: x / Ketone: Negative  / Bili: Negative / Urobili: Negative   Blood: x / Protein: Trace / Nitrite: Negative   Leuk Esterase: Negative / RBC: 6 /hpf / WBC 0 /HPF   Sq Epi: x / Non Sq Epi: 0 /hpf / Bacteria: Negative        LIVER FUNCTIONS - ( 2022 09:23 )  Alb: 3.9 g/dL / Pro: 7.7 g/dL / ALK PHOS: 373 U/L / ALT: 146 U/L / AST: 28 U/L / GGT: x             Blood Gas Profile w/Lytes - Venous: Performed In Lab (22 @ 08:55)    Blood Gas Profile w/Lytes - Venous: Performed In Lab (22 @ 08:55)    I&O's Summary         /     CAPILLARY BLOOD GLUCOSE                MICRO: LABS:                           12.4   6.11  )-----------( 186      ( 2022 09:23 )             38.7         143  |  103  |  21  ----------------------------<  132<H>  4.1   |  25  |  1.23    Ca    9.0      2022 09:23    TPro  7.7  /  Alb  3.9  /  TBili  0.9  /  DBili  x   /  AST  28  /  ALT  146<H>  /  AlkPhos  373<H>          PT/INR - ( 2022 09:23 )   PT: 12.0 sec;   INR: 1.03 ratio         PTT - ( 2022 09:23 )  PTT:29.6 sec      Urinalysis Basic - ( 2022 11:59 )    Color: Light Yellow / Appearance: Clear / S.033 / pH: x  Gluc: x / Ketone: Negative  / Bili: Negative / Urobili: Negative   Blood: x / Protein: Trace / Nitrite: Negative   Leuk Esterase: Negative / RBC: 6 /hpf / WBC 0 /HPF   Sq Epi: x / Non Sq Epi: 0 /hpf / Bacteria: Negative        LIVER FUNCTIONS - ( 2022 09:23 )  Alb: 3.9 g/dL / Pro: 7.7 g/dL / ALK PHOS: 373 U/L / ALT: 146 U/L / AST: 28 U/L / GGT: x             Blood Gas Profile w/Lytes - Venous: Performed In Lab (22 @ 08:55)    Blood Gas Profile w/Lytes - Venous: Performed In Lab (22 @ 08:55)    INTERPRETATION:  CLINICAL INFORMATION: Biliary stricture status post   stent placement 10 days ago presenting with abdominal pain    IMPRESSION:  No acute abdominal or pelvic pathology.    Previously described mass encasing the mid midportion of the CBD is not   well visualized. CBD stent in place. Pneumobilia. Mild intrahepatic   ductal dilatation.        I&O's Summary         /     CAPILLARY BLOOD GLUCOSE                MICRO: 9

## 2022-10-04 NOTE — PATIENT PROFILE OB - BREASTFEEDING PROVIDES MATERNAL HEALTH BENEFITS, DECREASED PREMENOPAUSAL BREAST CANCER, OVARIAN CANCER AND TYPE II DIABETES MELLITUS
"[facility] Racquel Colunga MD   Discharging Provider: Minh Blanchard MD  Primary Care Provider: Nicol Mahan MD  [facility] Medicine Team: [facility] 2 Minh Blanchard MD    HPI:   Gilda Agee [facility]  [facility]   Statement Selected

## 2024-03-08 ENCOUNTER — ASOB RESULT (OUTPATIENT)
Age: 34
End: 2024-03-08

## 2024-03-08 ENCOUNTER — APPOINTMENT (OUTPATIENT)
Dept: ANTEPARTUM | Facility: CLINIC | Age: 34
End: 2024-03-08
Payer: COMMERCIAL

## 2024-03-08 PROCEDURE — 36415 COLL VENOUS BLD VENIPUNCTURE: CPT

## 2024-03-08 PROCEDURE — 93976 VASCULAR STUDY: CPT

## 2024-03-08 PROCEDURE — 76813 OB US NUCHAL MEAS 1 GEST: CPT

## 2024-04-12 ENCOUNTER — APPOINTMENT (OUTPATIENT)
Dept: ANTEPARTUM | Facility: CLINIC | Age: 34
End: 2024-04-12
Payer: COMMERCIAL

## 2024-04-12 ENCOUNTER — ASOB RESULT (OUTPATIENT)
Age: 34
End: 2024-04-12

## 2024-04-12 PROCEDURE — 76817 TRANSVAGINAL US OBSTETRIC: CPT | Mod: 59

## 2024-04-12 PROCEDURE — 76805 OB US >/= 14 WKS SNGL FETUS: CPT

## 2024-05-06 NOTE — LACTATION INITIAL EVALUATION - INTERVENTION OUTCOME
Need to know dose.    LM for patient to return call.     Patient returned call. Patient is currently Levothyroxine 100 mcg daily. Dose confirmed. Patient does not need refills at this time. Encouraged the patient/family member to call/message our office with any questions or concerns.    JOSELUIS Palmer RN     verbalizes understanding/demonstrates understanding of teaching/good return demonstration

## 2024-05-13 ENCOUNTER — APPOINTMENT (OUTPATIENT)
Dept: ANTEPARTUM | Facility: CLINIC | Age: 34
End: 2024-05-13

## 2024-05-13 ENCOUNTER — ASOB RESULT (OUTPATIENT)
Age: 34
End: 2024-05-13

## 2024-05-13 PROCEDURE — 76811 OB US DETAILED SNGL FETUS: CPT

## 2024-05-13 PROCEDURE — 76817 TRANSVAGINAL US OBSTETRIC: CPT | Mod: 59

## 2024-07-12 ENCOUNTER — APPOINTMENT (OUTPATIENT)
Dept: ANTEPARTUM | Facility: CLINIC | Age: 34
End: 2024-07-12
Payer: COMMERCIAL

## 2024-07-12 ENCOUNTER — ASOB RESULT (OUTPATIENT)
Age: 34
End: 2024-07-12

## 2024-07-12 PROCEDURE — 76819 FETAL BIOPHYS PROFIL W/O NST: CPT | Mod: 59

## 2024-07-12 PROCEDURE — 76820 UMBILICAL ARTERY ECHO: CPT | Mod: 59

## 2024-07-12 PROCEDURE — 76816 OB US FOLLOW-UP PER FETUS: CPT

## 2024-08-16 PROBLEM — Z00.00 ENCOUNTER FOR PREVENTIVE HEALTH EXAMINATION: Status: ACTIVE | Noted: 2024-08-16

## 2024-08-23 ENCOUNTER — APPOINTMENT (OUTPATIENT)
Dept: ANTEPARTUM | Facility: CLINIC | Age: 34
End: 2024-08-23
Payer: COMMERCIAL

## 2024-08-23 ENCOUNTER — ASOB RESULT (OUTPATIENT)
Age: 34
End: 2024-08-23

## 2024-08-23 PROCEDURE — 76819 FETAL BIOPHYS PROFIL W/O NST: CPT | Mod: 59

## 2024-08-23 PROCEDURE — 76816 OB US FOLLOW-UP PER FETUS: CPT

## 2024-09-10 ENCOUNTER — INPATIENT (INPATIENT)
Facility: HOSPITAL | Age: 34
LOS: 2 days | Discharge: HOME | End: 2024-09-13
Attending: SPECIALIST | Admitting: SPECIALIST
Payer: COMMERCIAL

## 2024-09-10 VITALS
TEMPERATURE: 98 F | SYSTOLIC BLOOD PRESSURE: 154 MMHG | RESPIRATION RATE: 17 BRPM | WEIGHT: 143.96 LBS | HEIGHT: 64 IN | DIASTOLIC BLOOD PRESSURE: 98 MMHG | HEART RATE: 73 BPM

## 2024-09-10 LAB
ALBUMIN SERPL ELPH-MCNC: 3.3 G/DL — SIGNIFICANT CHANGE UP (ref 3.3–5)
ALP SERPL-CCNC: 166 U/L — HIGH (ref 40–120)
ALT FLD-CCNC: 13 U/L — SIGNIFICANT CHANGE UP (ref 10–45)
ANION GAP SERPL CALC-SCNC: 9 MMOL/L — SIGNIFICANT CHANGE UP (ref 5–17)
AST SERPL-CCNC: 22 U/L — SIGNIFICANT CHANGE UP (ref 10–40)
BASOPHILS # BLD AUTO: 0.01 K/UL — SIGNIFICANT CHANGE UP (ref 0–0.2)
BASOPHILS NFR BLD AUTO: 0.2 % — SIGNIFICANT CHANGE UP (ref 0–2)
BILIRUB SERPL-MCNC: 0.2 MG/DL — SIGNIFICANT CHANGE UP (ref 0.2–1.2)
BLD GP AB SCN SERPL QL: NEGATIVE — SIGNIFICANT CHANGE UP
BUN SERPL-MCNC: 16 MG/DL — SIGNIFICANT CHANGE UP (ref 7–23)
CALCIUM SERPL-MCNC: 9.1 MG/DL — SIGNIFICANT CHANGE UP (ref 8.4–10.5)
CHLORIDE SERPL-SCNC: 107 MMOL/L — SIGNIFICANT CHANGE UP (ref 96–108)
CO2 SERPL-SCNC: 21 MMOL/L — LOW (ref 22–31)
CREAT ?TM UR-MCNC: 29 MG/DL — SIGNIFICANT CHANGE UP
CREAT SERPL-MCNC: 0.64 MG/DL — SIGNIFICANT CHANGE UP (ref 0.5–1.3)
EGFR: 117 ML/MIN/1.73M2 — SIGNIFICANT CHANGE UP
EOSINOPHIL # BLD AUTO: 0.03 K/UL — SIGNIFICANT CHANGE UP (ref 0–0.5)
EOSINOPHIL NFR BLD AUTO: 0.6 % — SIGNIFICANT CHANGE UP (ref 0–6)
FIBRINOGEN PPP-MCNC: 454 MG/DL — HIGH (ref 200–445)
GLUCOSE SERPL-MCNC: 85 MG/DL — SIGNIFICANT CHANGE UP (ref 70–99)
HCT VFR BLD CALC: 36.4 % — SIGNIFICANT CHANGE UP (ref 34.5–45)
HGB BLD-MCNC: 12.8 G/DL — SIGNIFICANT CHANGE UP (ref 11.5–15.5)
IMM GRANULOCYTES NFR BLD AUTO: 0.4 % — SIGNIFICANT CHANGE UP (ref 0–0.9)
LDH SERPL L TO P-CCNC: 211 U/L — SIGNIFICANT CHANGE UP (ref 50–242)
LYMPHOCYTES # BLD AUTO: 1.38 K/UL — SIGNIFICANT CHANGE UP (ref 1–3.3)
LYMPHOCYTES # BLD AUTO: 26.5 % — SIGNIFICANT CHANGE UP (ref 13–44)
MCHC RBC-ENTMCNC: 33.3 PG — SIGNIFICANT CHANGE UP (ref 27–34)
MCHC RBC-ENTMCNC: 35.2 GM/DL — SIGNIFICANT CHANGE UP (ref 32–36)
MCV RBC AUTO: 94.8 FL — SIGNIFICANT CHANGE UP (ref 80–100)
MONOCYTES # BLD AUTO: 0.3 K/UL — SIGNIFICANT CHANGE UP (ref 0–0.9)
MONOCYTES NFR BLD AUTO: 5.8 % — SIGNIFICANT CHANGE UP (ref 2–14)
NEUTROPHILS # BLD AUTO: 3.46 K/UL — SIGNIFICANT CHANGE UP (ref 1.8–7.4)
NEUTROPHILS NFR BLD AUTO: 66.5 % — SIGNIFICANT CHANGE UP (ref 43–77)
NRBC # BLD: 0 /100 WBCS — SIGNIFICANT CHANGE UP (ref 0–0)
PLATELET # BLD AUTO: 152 K/UL — SIGNIFICANT CHANGE UP (ref 150–400)
POTASSIUM SERPL-MCNC: 4.2 MMOL/L — SIGNIFICANT CHANGE UP (ref 3.5–5.3)
POTASSIUM SERPL-SCNC: 4.2 MMOL/L — SIGNIFICANT CHANGE UP (ref 3.5–5.3)
PROT ?TM UR-MCNC: 16 MG/DL — HIGH (ref 0–12)
PROT SERPL-MCNC: 6.3 G/DL — SIGNIFICANT CHANGE UP (ref 6–8.3)
PROT/CREAT UR-RTO: 0.6 RATIO — HIGH (ref 0–0.2)
RBC # BLD: 3.84 M/UL — SIGNIFICANT CHANGE UP (ref 3.8–5.2)
RBC # FLD: 12.4 % — SIGNIFICANT CHANGE UP (ref 10.3–14.5)
RH IG SCN BLD-IMP: NEGATIVE — SIGNIFICANT CHANGE UP
SODIUM SERPL-SCNC: 137 MMOL/L — SIGNIFICANT CHANGE UP (ref 135–145)
URATE SERPL-MCNC: 6.6 MG/DL — SIGNIFICANT CHANGE UP (ref 2.5–7)
WBC # BLD: 5.2 K/UL — SIGNIFICANT CHANGE UP (ref 3.8–10.5)
WBC # FLD AUTO: 5.2 K/UL — SIGNIFICANT CHANGE UP (ref 3.8–10.5)

## 2024-09-10 PROCEDURE — 88307 TISSUE EXAM BY PATHOLOGIST: CPT | Mod: 26

## 2024-09-10 RX ORDER — SODIUM CHLORIDE 9 MG/ML
3 INJECTION INTRAMUSCULAR; INTRAVENOUS; SUBCUTANEOUS EVERY 8 HOURS
Refills: 0 | Status: DISCONTINUED | OUTPATIENT
Start: 2024-09-10 | End: 2024-09-13

## 2024-09-10 RX ORDER — CHLORHEXIDINE GLUCONATE 40 MG/ML
1 SOLUTION TOPICAL DAILY
Refills: 0 | Status: DISCONTINUED | OUTPATIENT
Start: 2024-09-10 | End: 2024-09-10

## 2024-09-10 RX ORDER — MENTHOL/CETYLPYRD CL 3 MG
1 LOZENGE MUCOUS MEMBRANE EVERY 6 HOURS
Refills: 0 | Status: DISCONTINUED | OUTPATIENT
Start: 2024-09-10 | End: 2024-09-13

## 2024-09-10 RX ORDER — DIPHENHYDRAMINE HCL 50 MG
25 CAPSULE ORAL EVERY 6 HOURS
Refills: 0 | Status: DISCONTINUED | OUTPATIENT
Start: 2024-09-10 | End: 2024-09-13

## 2024-09-10 RX ORDER — OXYTOCIN 10 UNIT/ML
167 AMPUL (ML) INJECTION
Qty: 30 | Refills: 0 | Status: DISCONTINUED | OUTPATIENT
Start: 2024-09-10 | End: 2024-09-10

## 2024-09-10 RX ORDER — TETANUS TOXOID, REDUCED DIPHTHERIA TOXOID AND ACELLULAR PERTUSSIS VACCINE, ADSORBED 5; 2.5; 8; 8; 2.5 [IU]/.5ML; [IU]/.5ML; UG/.5ML; UG/.5ML; UG/.5ML
0.5 SUSPENSION INTRAMUSCULAR ONCE
Refills: 0 | Status: DISCONTINUED | OUTPATIENT
Start: 2024-09-10 | End: 2024-09-13

## 2024-09-10 RX ORDER — LANOLIN
1 OINTMENT (GRAM) TOPICAL EVERY 6 HOURS
Refills: 0 | Status: DISCONTINUED | OUTPATIENT
Start: 2024-09-10 | End: 2024-09-13

## 2024-09-10 RX ORDER — VITAMIN A, ASCORBIC ACID, VITAMIN D, .ALPHA.-TOCOPHEROL, THIAMINE MONONITRATE, RIBOFLAVIN, NIACIN, PYRIDOXINE HYDROCHLORIDE, FOLIC ACID, CYANOCOBALAMIN, CALCIUM, IRON, MAGNESIUM, ZINC, AND COPPER 2700; 70; 400; 30; 1.6; 1.8; 18; 2.5; 1; 12; 100; 65; 25; 25; 2 [IU]/1; MG/1; [IU]/1; [IU]/1; MG/1; MG/1; MG/1; MG/1; MG/1; UG/1; MG/1; MG/1; MG/1; MG/1; MG/1
1 TABLET ORAL DAILY
Refills: 0 | Status: DISCONTINUED | OUTPATIENT
Start: 2024-09-10 | End: 2024-09-13

## 2024-09-10 RX ORDER — ACETAMINOPHEN 325 MG/1
975 TABLET ORAL
Refills: 0 | Status: DISCONTINUED | OUTPATIENT
Start: 2024-09-10 | End: 2024-09-13

## 2024-09-10 RX ORDER — HYDROCORTISONE 1 %
1 OINTMENT (GRAM) TOPICAL EVERY 6 HOURS
Refills: 0 | Status: DISCONTINUED | OUTPATIENT
Start: 2024-09-10 | End: 2024-09-13

## 2024-09-10 RX ORDER — IBUPROFEN 600 MG
600 TABLET ORAL EVERY 6 HOURS
Refills: 0 | Status: COMPLETED | OUTPATIENT
Start: 2024-09-10 | End: 2025-08-09

## 2024-09-10 RX ORDER — KETOROLAC TROMETHAMINE 30 MG/ML
30 INJECTION, SOLUTION INTRAMUSCULAR ONCE
Refills: 0 | Status: DISCONTINUED | OUTPATIENT
Start: 2024-09-10 | End: 2024-09-11

## 2024-09-10 RX ORDER — OXYTOCIN 10 UNIT/ML
666 AMPUL (ML) INJECTION
Qty: 30 | Refills: 0 | Status: DISCONTINUED | OUTPATIENT
Start: 2024-09-10 | End: 2024-09-13

## 2024-09-10 RX ORDER — OXYCODONE HYDROCHLORIDE 5 MG/1
5 TABLET ORAL ONCE
Refills: 0 | Status: DISCONTINUED | OUTPATIENT
Start: 2024-09-10 | End: 2024-09-13

## 2024-09-10 RX ORDER — SODIUM CITRATE AND CITRIC ACID MONOHYDRATE 334; 500 MG/5ML; MG/5ML
15 SOLUTION ORAL EVERY 6 HOURS
Refills: 0 | Status: DISCONTINUED | OUTPATIENT
Start: 2024-09-10 | End: 2024-09-10

## 2024-09-10 RX ORDER — WITCH HAZEL 1 G/ML
1 LIQUID TOPICAL EVERY 4 HOURS
Refills: 0 | Status: DISCONTINUED | OUTPATIENT
Start: 2024-09-10 | End: 2024-09-13

## 2024-09-10 RX ORDER — OXYTOCIN 10 UNIT/ML
2 AMPUL (ML) INJECTION
Qty: 30 | Refills: 0 | Status: DISCONTINUED | OUTPATIENT
Start: 2024-09-10 | End: 2024-09-13

## 2024-09-10 RX ORDER — OXYCODONE HYDROCHLORIDE 5 MG/1
5 TABLET ORAL
Refills: 0 | Status: DISCONTINUED | OUTPATIENT
Start: 2024-09-10 | End: 2024-09-13

## 2024-09-10 RX ORDER — FLU VACCINE TS 2012-2013(5YR+) 45MCG/.5ML
0.5 VIAL (ML) INTRAMUSCULAR ONCE
Refills: 0 | Status: DISCONTINUED | OUTPATIENT
Start: 2024-09-10 | End: 2024-09-13

## 2024-09-10 RX ORDER — OXYTOCIN 10 UNIT/ML
AMPUL (ML) INJECTION
Qty: 30 | Refills: 0 | Status: DISCONTINUED | OUTPATIENT
Start: 2024-09-10 | End: 2024-09-10

## 2024-09-10 RX ORDER — ACETAMINOPHEN 325 MG/1
1000 TABLET ORAL ONCE
Refills: 0 | Status: COMPLETED | OUTPATIENT
Start: 2024-09-10 | End: 2024-09-10

## 2024-09-10 RX ORDER — PRAMOXINE HCL 1 %
1 GEL (GRAM) TOPICAL EVERY 4 HOURS
Refills: 0 | Status: DISCONTINUED | OUTPATIENT
Start: 2024-09-10 | End: 2024-09-13

## 2024-09-10 RX ADMIN — Medication 2 MILLIUNIT(S)/MIN: at 12:08

## 2024-09-10 RX ADMIN — Medication 125 MILLILITER(S): at 18:25

## 2024-09-10 RX ADMIN — ACETAMINOPHEN 1000 MILLIGRAM(S): 325 TABLET ORAL at 18:25

## 2024-09-10 RX ADMIN — ACETAMINOPHEN 400 MILLIGRAM(S): 325 TABLET ORAL at 12:11

## 2024-09-10 RX ADMIN — Medication 2 MILLIUNIT(S)/MIN: at 22:10

## 2024-09-10 RX ADMIN — Medication 125 MILLILITER(S): at 19:00

## 2024-09-10 RX ADMIN — Medication 125 MILLILITER(S): at 11:00

## 2024-09-10 NOTE — OB RN DELIVERY SUMMARY - NSSELHIDDEN_OBGYN_ALL_OB_FT
[NS_DeliveryAttending1_OBGYN_ALL_OB_FT:[id number]],[NS_DeliveryRN_OBGYN_ALL_OB_FT:[id number]]

## 2024-09-10 NOTE — OB RN PATIENT PROFILE - RUBELLA: DATE, OB PROFILE
-- Message is from the [facility]--    Called and left voicemail to inform patient to call the practice site back to update insurance information below:    please advise patient that their insurance is out of network    ACC AGENT: When patient calls back:  Do not transfer patient to registration.  Collect missing insurance information to get to the green checkmark and verify insurance (refer to PIE tool).           26-Jan-2024

## 2024-09-10 NOTE — OB RN DELIVERY SUMMARY - APGAR COMPLETED BY
.1)Antihistamines(Allegra, Claritin, Xzyal, Zyrtec)  2)Nasal Steroids (Nasocort, Rhinocort, Flonase)  3)Distilled salt water sinus rinses via neti pots or products such as Param Med Sinus Rinse or Sinugator. Must wash container or device and use bottled water to avoid introducing infection.   You can can use (as directed) any combination of these three things every day of your life if needed in order to treat or control your symptoms. Brand name use of medications is not necessary     Nurse

## 2024-09-10 NOTE — OB RN PATIENT PROFILE - FALL HARM RISK - UNIVERSAL INTERVENTIONS
Bed in lowest position, wheels locked, appropriate side rails in place/Call bell, personal items and telephone in reach/Instruct patient to call for assistance before getting out of bed or chair/Non-slip footwear when patient is out of bed/[place] to call system/Physically safe environment - no spills, clutter or unnecessary equipment/Purposeful Proactive Rounding/Room/bathroom lighting operational, light cord in reach

## 2024-09-10 NOTE — OB RN PATIENT PROFILE - COMFORT/ACCEPTABLE PAIN LEVEL (0-10)
PT IE completed. Chart reviewed. Patient with complaints of 4/10 (R)sided headache at rest although pre-medicated and remained agreeable to PT. Patient received seated OOB in chair, NAD, +tele, +cranial dressing C/D/I, +2 hemovacs intact, +abdominal incision C/D/I, +(L)dorsal foot IV hep lock, chronic (R)eye-lid closure, DIMITRI Pollock (majo) cleared patient for treatment.
0

## 2024-09-10 NOTE — OB PROVIDER H&P - HISTORY OF PRESENT ILLNESS
Patient is a 34 y.o.  @38w6d who presents for IOL for PEC w/o SF. States blood pressure has been elevated intermittently over the past 2 weeks. She was on ASA during the pregnancy until 37wk. Pt completed 24h urine on , per attending it was ~750. Denies headache, visual changes, chest pain, SOB, RUQ/epigastric pain. ctx, LOF, vaginal bleeding. Endorses fetal movement. Last VE in the office 1cm last week.     Spontaneous pregnancy. NIPT and anatomy scan WNL. Passed GCT. EFW 3400g. GBS negative.  BH 24: longitudinal lie, cephalic presentation, anterior placenta, RIGOBERTO 20.35, BPP , EFW 2735g 35%ile    Ob hx:   G1-  , PEC w/o SF, 3120g  G2- current  Gyn hx: Denies ovarian cysts, fibroids, abn pap, STIs/HSV  PMH: GERD  Meds: PNV  PSH: wisdom teeth removal  Allergies: Denies    Physical Exam  BP  154/98 (09-10-24 @ 10:29)  HR 73  Gen: Well-appearing. NAD.  Resp: Breathing comfortably on RA  Abd: Gravid uterus. Soft, non-tender, non-distended.    VE: 2/long  TAUS: cephalic presentation.    FHT: Baseline 125, moderate variability, +accels, -decels; Cat I tracing  [place]: Ctx irregular, 2 in 10 minutes    A/P  34 y.o.  @38w6d presenting for IOL for PEC w/o SF.   -Pt normotensive with no toxic complaints  -Fetal status reassuring given tracing is Cat I  -Admit to L&D  -IV fluids  -Labs sent  -Prenatals reviewed  -GBS negative, no indication for ppx  -Risks/benefits/alternatives discussed; consents signed and in chart; all questions answered  -Continuous FHT and TOCO  -Plan for balloon and pitocin    Trinh Hernandez, PGY1  Discussed with Dr. Ayon and Dr. Phan, PGY4

## 2024-09-10 NOTE — OB RN DELIVERY SUMMARY - NS_SEPSISRSKCALC_OBGYN_ALL_OB_FT
EOS calculated successfully. EOS Risk Factor: 0.5/1000 live births ([facility] national incidence); GA=38w6d; Temp=98.2; ROM=4.267; GBS='Negative'; Antibiotics='No antibiotics or any antibiotics < 2 hrs prior to birth'

## 2024-09-10 NOTE — OB PROVIDER LABOR PROGRESS NOTE - ASSESSMENT
cook balloon in place   FHT reassuring   pitocin @ 4mu]  cont current management 
- Cook balloon in situ  - Plan to start pitocin  - MR x1: monitor BPs and full labs  - Will continue to monitor
S/p Cook balloon   SVE 4-5/50/-3  pitocin @ 18mu   Too high to AROM, possibly with next exam is appropriate   cont current management

## 2024-09-10 NOTE — OB PROVIDER LABOR PROGRESS NOTE - NS_OBIHIFHRDETAILS_OBGYN_ALL_OB_FT
Patient's baseline was 120bpm, but now may be changing to to 140bpm, moderate variability, + accels, 1 possible decel during baseline change, overall reassuring
FHT Cat 1,  bpm, moderate variability, + accels, - decels.
120bpm, moderate variability, +accels, no decels

## 2024-09-11 LAB
MAGNESIUM SERPL-MCNC: 4.7 MG/DL — HIGH (ref 1.6–2.6)
T PALLIDUM AB TITR SER: NEGATIVE — SIGNIFICANT CHANGE UP

## 2024-09-11 RX ORDER — NIFEDIPINE 60 MG/1
30 TABLET, FILM COATED, EXTENDED RELEASE ORAL DAILY
Refills: 0 | Status: DISCONTINUED | OUTPATIENT
Start: 2024-09-11 | End: 2024-09-11

## 2024-09-11 RX ORDER — NIFEDIPINE 60 MG/1
30 TABLET, FILM COATED, EXTENDED RELEASE ORAL EVERY 12 HOURS
Refills: 0 | Status: DISCONTINUED | OUTPATIENT
Start: 2024-09-11 | End: 2024-09-13

## 2024-09-11 RX ORDER — IBUPROFEN 600 MG
600 TABLET ORAL EVERY 6 HOURS
Refills: 0 | Status: DISCONTINUED | OUTPATIENT
Start: 2024-09-11 | End: 2024-09-13

## 2024-09-11 RX ORDER — HYDRALAZINE HCL 50 MG
10 TABLET ORAL ONCE
Refills: 0 | Status: COMPLETED | OUTPATIENT
Start: 2024-09-11 | End: 2024-09-11

## 2024-09-11 RX ADMIN — Medication 666 MILLIUNIT(S)/MIN: at 00:02

## 2024-09-11 RX ADMIN — Medication 600 MILLIGRAM(S): at 15:20

## 2024-09-11 RX ADMIN — Medication 167 MILLIUNIT(S)/MIN: at 00:45

## 2024-09-11 RX ADMIN — ACETAMINOPHEN 975 MILLIGRAM(S): 325 TABLET ORAL at 06:54

## 2024-09-11 RX ADMIN — NIFEDIPINE 30 MILLIGRAM(S): 60 TABLET, FILM COATED, EXTENDED RELEASE ORAL at 09:45

## 2024-09-11 RX ADMIN — Medication 75 MILLILITER(S): at 15:40

## 2024-09-11 RX ADMIN — Medication 300 GRAM(S): at 15:40

## 2024-09-11 RX ADMIN — KETOROLAC TROMETHAMINE 30 MILLIGRAM(S): 30 INJECTION, SOLUTION INTRAMUSCULAR at 02:01

## 2024-09-11 RX ADMIN — VITAMIN A, ASCORBIC ACID, VITAMIN D, .ALPHA.-TOCOPHEROL, THIAMINE MONONITRATE, RIBOFLAVIN, NIACIN, PYRIDOXINE HYDROCHLORIDE, FOLIC ACID, CYANOCOBALAMIN, CALCIUM, IRON, MAGNESIUM, ZINC, AND COPPER 1 TABLET(S): 2700; 70; 400; 30; 1.6; 1.8; 18; 2.5; 1; 12; 100; 65; 25; 25; 2 TABLET ORAL at 12:49

## 2024-09-11 RX ADMIN — Medication 600 MILLIGRAM(S): at 09:45

## 2024-09-11 RX ADMIN — KETOROLAC TROMETHAMINE 30 MILLIGRAM(S): 30 INJECTION, SOLUTION INTRAMUSCULAR at 02:16

## 2024-09-11 RX ADMIN — Medication 10 MILLIGRAM(S): at 14:25

## 2024-09-11 RX ADMIN — ACETAMINOPHEN 975 MILLIGRAM(S): 325 TABLET ORAL at 17:41

## 2024-09-11 RX ADMIN — NIFEDIPINE 30 MILLIGRAM(S): 60 TABLET, FILM COATED, EXTENDED RELEASE ORAL at 15:21

## 2024-09-11 RX ADMIN — Medication 600 MILLIGRAM(S): at 21:05

## 2024-09-11 RX ADMIN — Medication 50 GM/HR: at 16:12

## 2024-09-11 RX ADMIN — ACETAMINOPHEN 975 MILLIGRAM(S): 325 TABLET ORAL at 12:49

## 2024-09-11 RX ADMIN — SODIUM CHLORIDE 3 MILLILITER(S): 9 INJECTION INTRAMUSCULAR; INTRAVENOUS; SUBCUTANEOUS at 22:14

## 2024-09-11 NOTE — PROGRESS NOTE ADULT - SUBJECTIVE AND OBJECTIVE BOX
33yo  s/p  at 38w6d c/b PEC w/o SF, now with severe features (BP's) postpartum. Patient reviewed PO nifedipine 30 mg at 09:45 with continued mild range BP's. Original BP noted was 160/94, repeat was 174/109. Patient denies toxic complaints including HA, visual changes, RUQ pain, SOB/CP, or worsening edema.     Vital Signs Last 24 Hrs  T(C): 36.8 (11 Sep 2024 13:40), Max: 36.8 (10 Sep 2024 18:09)  T(F): 98.3 (11 Sep 2024 13:40), Max: 98.3 (11 Sep 2024 13:40)  HR: 47 (11 Sep 2024 13:55) (47 - 73)  BP: 160/94 (11 Sep 2024 13:55) (126/70 - 160/94)  BP(mean): --  RR: 18 (11 Sep 2024 13:55) (17 - 19)  SpO2: 99% (11 Sep 2024 13:55) (97% - 100%)    Parameters below as of 11 Sep 2024 13:55  Patient On (Oxygen Delivery Method): room air    BP: 126-174/  HR: 47-67  Gen: NAD, A&Ox3  Abd: non-tender in all quadrants  LE: no swelling or pitting edema  Skin: no excoriations or rashes  Pulm: no increased WOB    MEDICATIONS  (STANDING):  acetaminophen     Tablet .. 975 milliGRAM(s) Oral <User Schedule>  diphtheria/tetanus/pertussis (acellular) Vaccine (Adacel) 0.5 milliLiter(s) IntraMuscular once  fentanyl (2 MICROgram(s)/mL) + bupivacaine 0.0625%  in 0.9% Sodium Chloride PCEA 250 milliLiter(s) Epidural PCA Continuous  hydrALAZINE Injectable 10 milliGRAM(s) IV Push once  ibuprofen  Tablet. 600 milliGRAM(s) Oral every 6 hours  influenza   Vaccine 0.5 milliLiter(s) IntraMuscular once  lactated ringers. 1000 milliLiter(s) (75 mL/Hr) IV Continuous <Continuous>  magnesium sulfate  IVPB 4 Gram(s) IV Intermittent once  magnesium sulfate Infusion 2 Gm/Hr (50 mL/Hr) IV Continuous <Continuous>  NIFEdipine XL 30 milliGRAM(s) Oral every 12 hours  oxytocin Infusion 666 milliUNIT(s)/Min (666 mL/Hr) IV Continuous <Continuous>  oxytocin Infusion. 2 milliUNIT(s)/Min (2 mL/Hr) IV Continuous <Continuous>  prenatal multivitamin 1 Tablet(s) Oral daily  sodium chloride 0.9% lock flush 3 milliLiter(s) IV Push every 8 hours       35yo  s/p  at 38w6d c/b PEC w/o SF, now with severe features (BP's) postpartum. Patient reviewed PO nifedipine 30 mg at 09:45 with continued mild range BP's. Original BP noted was 160/94, repeat was 174/109. IV hydralazine given at 14:30, repeat BP was mild range at 145/89. Patient denies toxic complaints including HA, visual changes, RUQ pain, SOB/CP, or worsening edema.     Vital Signs Last 24 Hrs  T(C): 36.8 (11 Sep 2024 13:40), Max: 36.8 (10 Sep 2024 18:09)  T(F): 98.3 (11 Sep 2024 13:40), Max: 98.3 (11 Sep 2024 13:40)  HR: 47 (11 Sep 2024 13:55) (47 - 73)  BP: 160/94 (11 Sep 2024 13:55) (126/70 - 160/94)  BP(mean): --  RR: 18 (11 Sep 2024 13:55) (17 - 19)  SpO2: 99% (11 Sep 2024 13:55) (97% - 100%)    Parameters below as of 11 Sep 2024 13:55  Patient On (Oxygen Delivery Method): room air    BP: 126-174/  HR: 47-67  Gen: NAD, A&Ox3  Abd: non-tender in all quadrants  LE: no swelling or pitting edema  Skin: no excoriations or rashes  Pulm: no increased WOB    MEDICATIONS  (STANDING):  acetaminophen     Tablet .. 975 milliGRAM(s) Oral <User Schedule>  diphtheria/tetanus/pertussis (acellular) Vaccine (Adacel) 0.5 milliLiter(s) IntraMuscular once  fentanyl (2 MICROgram(s)/mL) + bupivacaine 0.0625%  in 0.9% Sodium Chloride PCEA 250 milliLiter(s) Epidural PCA Continuous  hydrALAZINE Injectable 10 milliGRAM(s) IV Push once  ibuprofen  Tablet. 600 milliGRAM(s) Oral every 6 hours  influenza   Vaccine 0.5 milliLiter(s) IntraMuscular once  lactated ringers. 1000 milliLiter(s) (75 mL/Hr) IV Continuous <Continuous>  magnesium sulfate  IVPB 4 Gram(s) IV Intermittent once  magnesium sulfate Infusion 2 Gm/Hr (50 mL/Hr) IV Continuous <Continuous>  NIFEdipine XL 30 milliGRAM(s) Oral every 12 hours  oxytocin Infusion 666 milliUNIT(s)/Min (666 mL/Hr) IV Continuous <Continuous>  oxytocin Infusion. 2 milliUNIT(s)/Min (2 mL/Hr) IV Continuous <Continuous>  prenatal multivitamin 1 Tablet(s) Oral daily  sodium chloride 0.9% lock flush 3 milliLiter(s) IV Push every 8 hours

## 2024-09-11 NOTE — OB PROVIDER DELIVERY SUMMARY - NSSELHIDDEN_OBGYN_ALL_OB_FT
[NS_DeliveryAttending1_OBGYN_ALL_OB_FT:[id number]],[NS_DeliveryRN_OBGYN_ALL_OB_FT:[id number]]

## 2024-09-11 NOTE — PROGRESS NOTE ADULT - ASSESSMENT
29 yo  s/p  at 38w6d  31 yo  s/p  at 38w6d for PEC w/o SF, now ruling in for PEC w/ SF (BP's).  - s/p hydralazine push x1, repeat BP mild range  - Transfer to high risk postpartum unit  - IV magnesium ordered with subsequent Mg checks  - Continue Nifedipine 30mg BID  - Will continue to monitor

## 2024-09-11 NOTE — OB NEONATOLOGY/PEDIATRICIAN DELIVERY SUMMARY - NSPEDSNEONOTESA_OBGYN_ALL_OB_FT
Peds team called to a  secondary to category 2 tracing. Mother noted to have Preeclampsia without severe features. Mother also exposed to hand-foot-mouth disease with lesions noted on hand. Maternal labs negative. Nuchal cord x 1 at delivery with terminal meconium noted. Infant cried spontaneously and was dried and bulb suctioned. Parents informed to follow hand hygiene protocol and contact precautions.

## 2024-09-11 NOTE — OB PROVIDER DELIVERY SUMMARY - NSPROVIDERDELIVERYNOTE_OBGYN_ALL_OB_FT
Baby delivered from ZHEN position over intact perineum. Tight nuchal cord x 1, clamped and cut. Peds present at delivery due to cat II. 6.14 lbs. Placenta removed spontaneously and intact. Second degree laceration was repaired with 2.0/3.0 chromic. Average EBL. Pt tolerated the procedure well. Baby to N.

## 2024-09-11 NOTE — PROGRESS NOTE ADULT - ASSESSMENT
A/P 34y s/p , PPD#1 , stable, meeting postpartum milestones    #Postpartum care  - Pain: well controlled on tylenol/motrin  - GI: Tolerating regular diet  - : urinating without difficulty/pain  - DVT prophylaxis: ambulating frequently  - Dispo: PPD 2, unless otherwise specified    #HFM Disease  -On contact precautions  -Left hand lesion resolving  -Continue to monitor for resolution of symptoms

## 2024-09-11 NOTE — PROGRESS NOTE ADULT - SUBJECTIVE AND OBJECTIVE BOX
Patient evaluated at bedside this morning, resting comfortable in bed, no acute events overnight.  She reports pain is well controlled with tylenol and motrin.  She denies headache, dizziness, chest pain, palpitations, shortness of breath, nausea, vomiting, fever, chills, heavy vaginal bleeding. She has been ambulating without assistance, voiding spontaneously.  Tolerating food well, without nausea/vomit.      Physical Exam:  T(C): 36.5 (09-11-24 @ 03:30), Max: 36.6 (09-11-24 @ 00:30)  HR: 57 (09-11-24 @ 03:30) (52 - 67)  BP: 144/82 (09-11-24 @ 03:30) (126/70 - 145/80)  RR: 17 (09-11-24 @ 03:30) (17 - 19)  SpO2: 97% (09-11-24 @ 03:30) (97% - 100%)    GA: NAD, A&O x 3  Pulm: no increased work of breathing  Abd: soft, nontender, nondistended, no rebound or guarding, uterus firm.  Extremities: no swelling or calf tenderness                          12.8   5.20  )-----------( 152      ( 10 Sep 2024 10:08 )             36.4     09-10    137  |  107  |  16  ----------------------------<  85  4.2   |  21<L>  |  0.64    Ca    9.1      10 Sep 2024 10:08    TPro  6.3  /  Alb  3.3  /  TBili  0.2  /  DBili  x   /  AST  22  /  ALT  13  /  AlkPhos  166<H>  09-10    acetaminophen     Tablet .. 975 milliGRAM(s) Oral <User Schedule>  benzocaine 20%/menthol 0.5% Spray 1 Spray(s) Topical every 6 hours PRN  dibucaine 1% Ointment 1 Application(s) Topical every 6 hours PRN  diphenhydrAMINE 25 milliGRAM(s) Oral every 6 hours PRN  diphtheria/tetanus/pertussis (acellular) Vaccine (Adacel) 0.5 milliLiter(s) IntraMuscular once  fentanyl (2 MICROgram(s)/mL) + bupivacaine 0.0625%  in 0.9% Sodium Chloride PCEA 250 milliLiter(s) Epidural PCA Continuous  hydrocortisone 1% Cream 1 Application(s) Topical every 6 hours PRN  ibuprofen  Tablet. 600 milliGRAM(s) Oral every 6 hours  influenza   Vaccine 0.5 milliLiter(s) IntraMuscular once  lanolin Ointment 1 Application(s) Topical every 6 hours PRN  magnesium hydroxide Suspension 30 milliLiter(s) Oral two times a day PRN  oxyCODONE    IR 5 milliGRAM(s) Oral every 3 hours PRN  oxyCODONE    IR 5 milliGRAM(s) Oral once PRN  oxytocin Infusion 666 milliUNIT(s)/Min IV Continuous <Continuous>  oxytocin Infusion. 2 milliUNIT(s)/Min IV Continuous <Continuous>  pramoxine 1%/zinc 5% Cream 1 Application(s) Topical every 4 hours PRN  prenatal multivitamin 1 Tablet(s) Oral daily  simethicone 80 milliGRAM(s) Chew every 4 hours PRN  sodium chloride 0.9% lock flush 3 milliLiter(s) IV Push every 8 hours  witch hazel Pads 1 Application(s) Topical every 4 hours PRN

## 2024-09-12 LAB
ALBUMIN SERPL ELPH-MCNC: 3.2 G/DL — LOW (ref 3.3–5)
ALP SERPL-CCNC: 135 U/L — HIGH (ref 40–120)
ALT FLD-CCNC: 14 U/L — SIGNIFICANT CHANGE UP (ref 10–45)
ANION GAP SERPL CALC-SCNC: 9 MMOL/L — SIGNIFICANT CHANGE UP (ref 5–17)
APTT BLD: 27.2 SEC — SIGNIFICANT CHANGE UP (ref 24.5–35.6)
AST SERPL-CCNC: 34 U/L — SIGNIFICANT CHANGE UP (ref 10–40)
BASOPHILS # BLD AUTO: 0.01 K/UL — SIGNIFICANT CHANGE UP (ref 0–0.2)
BASOPHILS NFR BLD AUTO: 0.1 % — SIGNIFICANT CHANGE UP (ref 0–2)
BILIRUB SERPL-MCNC: <0.2 MG/DL — SIGNIFICANT CHANGE UP (ref 0.2–1.2)
BUN SERPL-MCNC: 10 MG/DL — SIGNIFICANT CHANGE UP (ref 7–23)
CALCIUM SERPL-MCNC: 7.3 MG/DL — LOW (ref 8.4–10.5)
CHLORIDE SERPL-SCNC: 103 MMOL/L — SIGNIFICANT CHANGE UP (ref 96–108)
CO2 SERPL-SCNC: 24 MMOL/L — SIGNIFICANT CHANGE UP (ref 22–31)
CREAT ?TM UR-MCNC: 11 MG/DL — SIGNIFICANT CHANGE UP
CREAT SERPL-MCNC: 0.59 MG/DL — SIGNIFICANT CHANGE UP (ref 0.5–1.3)
EGFR: 120 ML/MIN/1.73M2 — SIGNIFICANT CHANGE UP
EOSINOPHIL # BLD AUTO: 0.06 K/UL — SIGNIFICANT CHANGE UP (ref 0–0.5)
EOSINOPHIL NFR BLD AUTO: 0.5 % — SIGNIFICANT CHANGE UP (ref 0–6)
GLUCOSE SERPL-MCNC: 108 MG/DL — HIGH (ref 70–99)
HCT VFR BLD CALC: 33 % — LOW (ref 34.5–45)
HGB BLD-MCNC: 11.3 G/DL — LOW (ref 11.5–15.5)
IMM GRANULOCYTES NFR BLD AUTO: 0.3 % — SIGNIFICANT CHANGE UP (ref 0–0.9)
INR BLD: 0.77 — LOW (ref 0.85–1.18)
LDH SERPL L TO P-CCNC: 267 U/L — HIGH (ref 50–242)
LYMPHOCYTES # BLD AUTO: 1.87 K/UL — SIGNIFICANT CHANGE UP (ref 1–3.3)
LYMPHOCYTES # BLD AUTO: 15.5 % — SIGNIFICANT CHANGE UP (ref 13–44)
MAGNESIUM SERPL-MCNC: 6 MG/DL — HIGH (ref 1.6–2.6)
MAGNESIUM SERPL-MCNC: 6.7 MG/DL — HIGH (ref 1.6–2.6)
MCHC RBC-ENTMCNC: 31.6 PG — SIGNIFICANT CHANGE UP (ref 27–34)
MCHC RBC-ENTMCNC: 34.2 GM/DL — SIGNIFICANT CHANGE UP (ref 32–36)
MCV RBC AUTO: 92.2 FL — SIGNIFICANT CHANGE UP (ref 80–100)
MONOCYTES # BLD AUTO: 0.33 K/UL — SIGNIFICANT CHANGE UP (ref 0–0.9)
MONOCYTES NFR BLD AUTO: 2.7 % — SIGNIFICANT CHANGE UP (ref 2–14)
NEUTROPHILS # BLD AUTO: 9.75 K/UL — HIGH (ref 1.8–7.4)
NEUTROPHILS NFR BLD AUTO: 80.9 % — HIGH (ref 43–77)
NRBC # BLD: 0 /100 WBCS — SIGNIFICANT CHANGE UP (ref 0–0)
PLATELET # BLD AUTO: 163 K/UL — SIGNIFICANT CHANGE UP (ref 150–400)
POTASSIUM SERPL-MCNC: 3.9 MMOL/L — SIGNIFICANT CHANGE UP (ref 3.5–5.3)
POTASSIUM SERPL-SCNC: 3.9 MMOL/L — SIGNIFICANT CHANGE UP (ref 3.5–5.3)
PROT ?TM UR-MCNC: 12 MG/DL — SIGNIFICANT CHANGE UP (ref 0–12)
PROT SERPL-MCNC: 6.1 G/DL — SIGNIFICANT CHANGE UP (ref 6–8.3)
PROT/CREAT UR-RTO: 1.1 RATIO — HIGH (ref 0–0.2)
PROTHROM AB SERPL-ACNC: 8.9 SEC — LOW (ref 9.5–13)
RBC # BLD: 3.58 M/UL — LOW (ref 3.8–5.2)
RBC # FLD: 12.5 % — SIGNIFICANT CHANGE UP (ref 10.3–14.5)
SODIUM SERPL-SCNC: 136 MMOL/L — SIGNIFICANT CHANGE UP (ref 135–145)
URATE SERPL-MCNC: 5.9 MG/DL — SIGNIFICANT CHANGE UP (ref 2.5–7)
WBC # BLD: 12.06 K/UL — HIGH (ref 3.8–10.5)
WBC # FLD AUTO: 12.06 K/UL — HIGH (ref 3.8–10.5)

## 2024-09-12 RX ORDER — METOCLOPRAMIDE HCL 5 MG
10 TABLET ORAL ONCE
Refills: 0 | Status: COMPLETED | OUTPATIENT
Start: 2024-09-12 | End: 2024-09-12

## 2024-09-12 RX ORDER — FAMOTIDINE 10 MG/ML
20 INJECTION INTRAVENOUS ONCE
Refills: 0 | Status: DISCONTINUED | OUTPATIENT
Start: 2024-09-12 | End: 2024-09-12

## 2024-09-12 RX ORDER — FAMOTIDINE 10 MG/ML
20 INJECTION INTRAVENOUS ONCE
Refills: 0 | Status: COMPLETED | OUTPATIENT
Start: 2024-09-12 | End: 2024-09-12

## 2024-09-12 RX ADMIN — SODIUM CHLORIDE 3 MILLILITER(S): 9 INJECTION INTRAMUSCULAR; INTRAVENOUS; SUBCUTANEOUS at 14:06

## 2024-09-12 RX ADMIN — SODIUM CHLORIDE 3 MILLILITER(S): 9 INJECTION INTRAMUSCULAR; INTRAVENOUS; SUBCUTANEOUS at 06:25

## 2024-09-12 RX ADMIN — Medication 600 MILLIGRAM(S): at 03:19

## 2024-09-12 RX ADMIN — VITAMIN A, ASCORBIC ACID, VITAMIN D, .ALPHA.-TOCOPHEROL, THIAMINE MONONITRATE, RIBOFLAVIN, NIACIN, PYRIDOXINE HYDROCHLORIDE, FOLIC ACID, CYANOCOBALAMIN, CALCIUM, IRON, MAGNESIUM, ZINC, AND COPPER 1 TABLET(S): 2700; 70; 400; 30; 1.6; 1.8; 18; 2.5; 1; 12; 100; 65; 25; 25; 2 TABLET ORAL at 11:43

## 2024-09-12 RX ADMIN — ACETAMINOPHEN 975 MILLIGRAM(S): 325 TABLET ORAL at 11:42

## 2024-09-12 RX ADMIN — Medication 600 MILLIGRAM(S): at 14:53

## 2024-09-12 RX ADMIN — ACETAMINOPHEN 975 MILLIGRAM(S): 325 TABLET ORAL at 17:44

## 2024-09-12 RX ADMIN — NIFEDIPINE 30 MILLIGRAM(S): 60 TABLET, FILM COATED, EXTENDED RELEASE ORAL at 17:44

## 2024-09-12 RX ADMIN — ACETAMINOPHEN 975 MILLIGRAM(S): 325 TABLET ORAL at 19:17

## 2024-09-12 RX ADMIN — Medication 600 MILLIGRAM(S): at 09:07

## 2024-09-12 RX ADMIN — ACETAMINOPHEN 975 MILLIGRAM(S): 325 TABLET ORAL at 00:05

## 2024-09-12 RX ADMIN — Medication 600 MILLIGRAM(S): at 21:18

## 2024-09-12 RX ADMIN — ACETAMINOPHEN 975 MILLIGRAM(S): 325 TABLET ORAL at 06:32

## 2024-09-12 RX ADMIN — NIFEDIPINE 30 MILLIGRAM(S): 60 TABLET, FILM COATED, EXTENDED RELEASE ORAL at 06:32

## 2024-09-12 RX ADMIN — SODIUM CHLORIDE 3 MILLILITER(S): 9 INJECTION INTRAMUSCULAR; INTRAVENOUS; SUBCUTANEOUS at 22:00

## 2024-09-12 RX ADMIN — FAMOTIDINE 20 MILLIGRAM(S): 10 INJECTION INTRAVENOUS at 01:20

## 2024-09-12 RX ADMIN — Medication 10 MILLIGRAM(S): at 01:43

## 2024-09-12 NOTE — PROGRESS NOTE ADULT - ASSESSMENT
A/P 34y s/p , PPD#1 , stable, meeting postpartum milestones   #PEC w/ SF  -On IV Magnesium  -Denies toxic complaints of preeclampsia  -Remained normotensive overnight  -On nifedipine 30 mg BID  -Next mag check 930    #Postpartum care  - Pain: well controlled on tylenol/motrin  - GI: Tolerating regular diet  - : urinating without difficulty/pain  - DVT prophylaxis: ambulating frequently  - Dispo: PPD 2, unless otherwise specified

## 2024-09-12 NOTE — CHART NOTE - NSCHARTNOTEFT_GEN_A_CORE
Pt seen for a clinical magnesium check. She endorses a 4/10 headache, which she thinks is due to not eating all day and says it is resolving with dinner. She just received tylenol. She denies vision changes, dizziness, SOB, n/v, RUQ/epigastric pain.     Physical Exam:  T(C): 36.8 (24 @ 20:56), Max: 37 (24 @ 15:40)  HR: 83 (24 @ 20:56) (47 - 98)  BP: 129/81 (24 @ 20:56) (124/87 - 174/109)  RR: 20 (24 @ 20:56) (17 - 20)  SpO2: 95% (24 @ 15:40) (95% - 100%)    09-10-24 @ 07:01  -  24 @ 07:00  --------------------------------------------------------  IN: 0 mL / OUT: 2375 mL / NET: -2375 mL    24 @ 07:01  -  24 @ 21:40  --------------------------------------------------------  IN: 625 mL / OUT: 2800 mL / NET: -2175 mL      General: NAD, AAOx3  Pulm: no increased WOB, lungs clear to auscultation bilaterally  Abd: no tenderness in RUQ/epigastric areas  Extremities: warm/dry/intact skin, brachioradialis reflexes 3+ bilaterally, no edema    A&P   Pt is a 34y yo  s/p  at 38w6d , c/b PEC w/ SF, seen for a magnesium check.   1. Preeclampsia: Continue IV Magnesium @2G/hr for 24 hrs post delivery until 1530   Denying toxic symptoms currently.   Antihypertensives: nifedipine 30 mg BID  Continue to monitor blood pressures.   Next Magnesium check @ 0330  Follow up next Mag level.   2. GI: regular diet  3. : strict Is and Os until discontinuation of IV Magnesium
Pt is a 34y yo s/p   at 38w6d c/b PEC w/o SF, subsequently ruled in for PEC w/ SF, seen for a magnesium check. Endorses sluggish feeling, states her movements feel slower and she has a minor 2/10 headache between her eyes. Denies scotoma, SOB n/v, RUQ/epigastric pain.     Physical Exam:  T(C): 36.5 (24 @ 09:00), Max: 37 (24 @ 15:40)  HR: 88 (24 @ 09:00) (47 - 98)  BP: 130/77 (24 @ 09:00) (99/59 - 174/109)  RR: 18 (24 @ 09:00) (16 - 20)  SpO2: 97% (24 @ 09:00) (95% - 99%)    24 @ 07:01  -  24 @ 07:00  --------------------------------------------------------  IN: 1125 mL / OUT: 6000 mL / NET: -4875 mL    24 @ 07:01  -  24 @ 09:47  --------------------------------------------------------  IN: 0 mL / OUT: 800 mL / NET: -800 mL      General: NAD, AAOx3  Pulm: no increased WOB, lungs clear to auscultation bilaterally  Abd: no tenderness in RUQ/epigastric areas  Extremities: warm/dry/intact skin, brachioradialis reflex 1+ sluggish b/l, no edema      A&P:  Pt is a 34y yo s/p   at 38w6d c/b PEC w/o SF, subsequently ruled in for PEC w/ SF, seen for a magnesium check; suspicious for magnesium toxicity at this time, decreasing Mg rate from 2 to 1g/h. To follow up Mg serum, will adjust or pause rate as appropriate.    1. Preeclampsia: Decrease IV Magnesium @2G/hr for 24 hrs post delivery until 1530  Denying toxic symptoms currently.   Antihypertensives: nifedipine 30 mg BID.   Continue to monitor blood pressures.   Last Magnesium serum level 6. Follow up next level.     2. GI: regular diet    3. : strict Is and Os until after discontinuation of IV Magnesium
Pt seen for a clinical magnesium check. Endorses the resolution of her headache. She denies HA, vision changes, dizziness, SOB, n/v, RUQ/epigastric pain.     Physical Exam:  T(C): 36.4 (24 @ 01:00), Max: 37 (24 @ 15:40)  HR: 68 (24 @ 03:00) (47 - 98)  BP: 113/71 (24 @ 03:00) (99/59 - 174/109)  RR: 19 (24 @ 03:00) (16 - 20)  SpO2: 96% (24 @ 03:00) (95% - 99%)    09-10-24 @ 07:01  -  24 @ 07:00  --------------------------------------------------------  IN: 0 mL / OUT: 2375 mL / NET: -2375 mL    24 @ 07:01  -  24 @ 04:34  --------------------------------------------------------  IN: 1125 mL / OUT: 5100 mL / NET: -3975 mL      General: NAD, AAOx3  Pulm: no increased WOB, lungs clear to auscultation bilaterally  Abd: no tenderness in RUQ/epigastric areas  Extremities: warm/dry/intact skin, brachioradialis reflexes 2+ bilaterally, no edema    A&P   Pt is a 34y yo  s/p  at 38w6d , c/b PEC w/ SF, seen for a magnesium check.   1. Preeclampsia: Continue IV Magnesium @2G/hr for 24 hrs post delivery until   Denying toxic symptoms currently.   Antihypertensives: nifedipine 30 mg BID  Continue to monitor blood pressures.   Mag level 6.0  Next Magnesium check @ 0930  Follow up next Mag level.   2. GI: regular diet  3. : strict Is and Os until discontinuation of IV Magnesium.

## 2024-09-12 NOTE — PROGRESS NOTE ADULT - SUBJECTIVE AND OBJECTIVE BOX
Patient evaluated at bedside this morning, resting comfortable in bed. She is currently on IV magnesium.  She reports pain is well controlled with tylenol and motrin.  She denies headache, dizziness, vision changes, chest pain, palpitations, shortness of breath, nausea, vomiting, RUQ pain, fever, chills, heavy vaginal bleeding. She has been ambulating without assistance, voiding spontaneously.  Tolerating food well, without nausea/vomit.      Physical Exam:  T(C): 36.7 (09-12-24 @ 07:00), Max: 36.8 (09-11-24 @ 20:56)  HR: 72 (09-12-24 @ 07:00) (68 - 98)  BP: 104/61 (09-12-24 @ 07:00) (99/59 - 129/81)  RR: 18 (09-12-24 @ 07:00) (16 - 20)  SpO2: 98% (09-12-24 @ 07:00) (95% - 98%)    GA: NAD, A&O x 3  Pulm: no increased work of breathing  Abd: soft, nontender, nondistended, no rebound or guarding, uterus firm.  Extremities: no swelling or calf tenderness                          11.3   12.06 )-----------( 163      ( 12 Sep 2024 03:01 )             33.0     09-12    136  |  103  |  10  ----------------------------<  108<H>  3.9   |  24  |  0.59    Ca    7.3<L>      12 Sep 2024 03:01  Mg     6.0     09-12    TPro  6.1  /  Alb  3.2<L>  /  TBili  <0.2  /  DBili  x   /  AST  34  /  ALT  14  /  AlkPhos  135<H>  09-12    acetaminophen     Tablet .. 975 milliGRAM(s) Oral <User Schedule>  benzocaine 20%/menthol 0.5% Spray 1 Spray(s) Topical every 6 hours PRN  dibucaine 1% Ointment 1 Application(s) Topical every 6 hours PRN  diphenhydrAMINE 25 milliGRAM(s) Oral every 6 hours PRN  diphtheria/tetanus/pertussis (acellular) Vaccine (Adacel) 0.5 milliLiter(s) IntraMuscular once  hydrocortisone 1% Cream 1 Application(s) Topical every 6 hours PRN  ibuprofen  Tablet. 600 milliGRAM(s) Oral every 6 hours  influenza   Vaccine 0.5 milliLiter(s) IntraMuscular once  lactated ringers. 1000 milliLiter(s) IV Continuous <Continuous>  lanolin Ointment 1 Application(s) Topical every 6 hours PRN  magnesium hydroxide Suspension 30 milliLiter(s) Oral two times a day PRN  magnesium sulfate Infusion 2 Gm/Hr IV Continuous <Continuous>  NIFEdipine XL 30 milliGRAM(s) Oral every 12 hours  oxyCODONE    IR 5 milliGRAM(s) Oral every 3 hours PRN  oxyCODONE    IR 5 milliGRAM(s) Oral once PRN  oxytocin Infusion 666 milliUNIT(s)/Min IV Continuous <Continuous>  oxytocin Infusion. 2 milliUNIT(s)/Min IV Continuous <Continuous>  pramoxine 1%/zinc 5% Cream 1 Application(s) Topical every 4 hours PRN  prenatal multivitamin 1 Tablet(s) Oral daily  simethicone 80 milliGRAM(s) Chew every 4 hours PRN  sodium chloride 0.9% lock flush 3 milliLiter(s) IV Push every 8 hours  witch hazel Pads 1 Application(s) Topical every 4 hours PRN

## 2024-09-12 NOTE — OB PROVIDER LABOR PROGRESS NOTE - NS_SUBJECTIVE/OBJECTIVE_OBGYN_ALL_OB_FT
AROM 1925 clear  VE 4/50/-3  FHR reviewed.  Baseline rate is 120 bpm, mod danilo, + accels, - decels  Ctx q3 mins  Cat I tracing  Continue to monitor labor progression
EFM reviewed. Tracing remained category I after AROM at 1930 until 2045 when patient had a late deceleration. Over the following 20min, she had multiple late decelerations to a meli of 60bpm. Variability remains moderate. 3-4 ctx in 10 min on toco. She was found to have progressed to 8cm dilated at that time. Dr. Ayon called and now in house.
FHR reviewed.  Baseline rate is 115 bpm, mod danilo, - accels, + recurrent late decels  Ctx q2-3 mins  Cat II tracing with pushing with Dr. Ayon  Plan for  shortly
Patient seen at bedside for 4 min decel to a meli of low 80s. Pitocin paused; patient placed on hands and knees. Patient re-examined; exam unchanged. Fetal heart rate with recovery to baseline of 120. Pitocin to remain paused for 20-30 minutes. Plan to restart pitocin at that time.
tracing note
Patient seen at bedside for cook balloon placement. SVE: 2/long. Uterine balloon inserted, filled with 80cc of sterile fluid, and taped to tension. Patient tolerated well without complaints.
cervical check

## 2024-09-13 ENCOUNTER — APPOINTMENT (OUTPATIENT)
Dept: ANTEPARTUM | Facility: CLINIC | Age: 34
End: 2024-09-13

## 2024-09-13 ENCOUNTER — TRANSCRIPTION ENCOUNTER (OUTPATIENT)
Age: 34
End: 2024-09-13

## 2024-09-13 VITALS
HEART RATE: 78 BPM | DIASTOLIC BLOOD PRESSURE: 78 MMHG | OXYGEN SATURATION: 96 % | RESPIRATION RATE: 18 BRPM | SYSTOLIC BLOOD PRESSURE: 125 MMHG | TEMPERATURE: 99 F

## 2024-09-13 PROCEDURE — 82570 ASSAY OF URINE CREATININE: CPT

## 2024-09-13 PROCEDURE — 88307 TISSUE EXAM BY PATHOLOGIST: CPT

## 2024-09-13 PROCEDURE — 85610 PROTHROMBIN TIME: CPT

## 2024-09-13 PROCEDURE — 84550 ASSAY OF BLOOD/URIC ACID: CPT

## 2024-09-13 PROCEDURE — 36415 COLL VENOUS BLD VENIPUNCTURE: CPT

## 2024-09-13 PROCEDURE — 85384 FIBRINOGEN ACTIVITY: CPT

## 2024-09-13 PROCEDURE — 84156 ASSAY OF PROTEIN URINE: CPT

## 2024-09-13 PROCEDURE — 86900 BLOOD TYPING SEROLOGIC ABO: CPT

## 2024-09-13 PROCEDURE — 85730 THROMBOPLASTIN TIME PARTIAL: CPT

## 2024-09-13 PROCEDURE — 83615 LACTATE (LD) (LDH) ENZYME: CPT

## 2024-09-13 PROCEDURE — 86901 BLOOD TYPING SEROLOGIC RH(D): CPT

## 2024-09-13 PROCEDURE — 86780 TREPONEMA PALLIDUM: CPT

## 2024-09-13 PROCEDURE — 85025 COMPLETE CBC W/AUTO DIFF WBC: CPT

## 2024-09-13 PROCEDURE — 86850 RBC ANTIBODY SCREEN: CPT

## 2024-09-13 PROCEDURE — 83735 ASSAY OF MAGNESIUM: CPT

## 2024-09-13 PROCEDURE — 80053 COMPREHEN METABOLIC PANEL: CPT

## 2024-09-13 PROCEDURE — 59050 FETAL MONITOR W/REPORT: CPT

## 2024-09-13 RX ORDER — VITAMIN A, ASCORBIC ACID, VITAMIN D, .ALPHA.-TOCOPHEROL, THIAMINE MONONITRATE, RIBOFLAVIN, NIACIN, PYRIDOXINE HYDROCHLORIDE, FOLIC ACID, CYANOCOBALAMIN, CALCIUM, IRON, MAGNESIUM, ZINC, AND COPPER 2700; 70; 400; 30; 1.6; 1.8; 18; 2.5; 1; 12; 100; 65; 25; 25; 2 [IU]/1; MG/1; [IU]/1; [IU]/1; MG/1; MG/1; MG/1; MG/1; MG/1; UG/1; MG/1; MG/1; MG/1; MG/1; MG/1
1 TABLET ORAL
Qty: 0 | Refills: 0 | DISCHARGE
Start: 2024-09-13

## 2024-09-13 RX ADMIN — Medication 600 MILLIGRAM(S): at 03:01

## 2024-09-13 RX ADMIN — Medication 600 MILLIGRAM(S): at 09:00

## 2024-09-13 RX ADMIN — Medication 600 MILLIGRAM(S): at 10:00

## 2024-09-13 RX ADMIN — ACETAMINOPHEN 975 MILLIGRAM(S): 325 TABLET ORAL at 07:11

## 2024-09-13 RX ADMIN — ACETAMINOPHEN 975 MILLIGRAM(S): 325 TABLET ORAL at 00:27

## 2024-09-13 RX ADMIN — NIFEDIPINE 30 MILLIGRAM(S): 60 TABLET, FILM COATED, EXTENDED RELEASE ORAL at 07:12

## 2024-09-13 NOTE — DISCHARGE NOTE OB - CARE PROVIDER_API CALL
Sean Ayon  Obstetrics and Gynecology  [address] [place] [id number]  Phone: [phone]  Fax: [phone]  Follow Up Time:

## 2024-09-13 NOTE — PROGRESS NOTE ADULT - ASSESSMENT
A/P 34y s/p , PPD#2, stable, meeting postpartum milestones   #PEC w/ SF  -S/p IV Magnesium  -Denies toxic complaints of preeclampsia  -Remained normotensive overnight  -On nifedipine 30 mg BID  -Continue to monitor VSq4    #Postpartum care  - Pain: well controlled on tylenol/motrin  - GI: Tolerating regular diet  - : urinating without difficulty/pain  - DVT prophylaxis: ambulating frequently  - Dispo: PPD 2, unless otherwise specified

## 2024-09-13 NOTE — DISCHARGE NOTE NEWBORN NICU - NSDCMRMEDTOKEN_GEN_ALL_CORE_FT
acetaminophen 325 mg oral tablet: 3 tab(s) orally every 6 hours  ibuprofen 600 mg oral tablet: 1 tab(s) orally every 6 hours  NIFEdipine 30 mg oral tablet, extended release: 1 tab(s) orally every 12 hours MDD:2 tabs  Prenatal Multivitamins with Folic Acid 1 mg oral tablet: 1 tab(s) orally once a day

## 2024-09-13 NOTE — DISCHARGE NOTE OB - HOSPITAL COURSE
Pt is a 34yF s/p . Postpartum course significant for Preeclampsia with severe features for which she received IV Mg from -. BPs well controlled on nifedipine 30 BID.  Please see delivery note for details.   On day of discharge patient was ambulating, pt had adequate oral intake, and was voiding freely.  Discharge instructions and precautions were given.  Will return to clinic in 4 weeks for postpartum visit.

## 2024-09-13 NOTE — DISCHARGE NOTE OB - PAIN IN THE CALVES OF YOUR LEGS
Last refilled on 03/12/2021 for # 360 with 0 rf. Last labs 04/05/2021  Last seen on 11/24/2020. Thank you.
Statement Selected

## 2024-09-13 NOTE — DISCHARGE NOTE NEWBORN NICU - PATIENT PORTAL LINK FT
You can access the FollowMyHealth Patient Portal offered by [facility] by registering at the following website: [email or web address]. By joining [employer]’s FollowMyHealth portal, you will also be able to view your health information using other applications (apps) compatible with our system.

## 2024-09-13 NOTE — DISCHARGE NOTE NEWBORN NICU - NSSYNAGISRISKFACTORS_OBGYN_N_OB_FT
For more information on Synagis risk factors, visit: https://publications.aap.org/redbook/book/347/chapter/7976384/Respiratory-Syncytial-Virus

## 2024-09-13 NOTE — DISCHARGE NOTE OB - MEDICATION SUMMARY - MEDICATIONS TO TAKE
I will START or STAY ON the medications listed below when I get home from the hospital:    ibuprofen 600 mg oral tablet  -- 1 tab(s) by mouth every 6 hours  -- Indication: For pain    acetaminophen 325 mg oral tablet  -- 3 tab(s) by mouth every 6 hours  -- Indication: For pain    NIFEdipine 30 mg oral tablet, extended release  -- 1 tab(s) by mouth every 12 hours MDD:2 tabs  -- Indication: For blood pressure    Prenatal Multivitamins with Folic Acid 1 mg oral tablet  -- 1 tab(s) by mouth once a day  -- Indication: For postpartum

## 2024-09-13 NOTE — PROGRESS NOTE ADULT - SUBJECTIVE AND OBJECTIVE BOX
Patient evaluated at bedside this morning, resting comfortable in bed, no acute events overnight.  She reports pain is well controlled with tylenol and motrin.  She denies headache, dizziness, vision changes, chest pain, palpitations, shortness of breath, nausea, vomiting, RUQ pain, fever, chills, heavy vaginal bleeding. She has been ambulating without assistance, voiding spontaneously.  Tolerating food well, without nausea/vomit.      Physical Exam:  T(C): 36.8 (09-13-24 @ 06:13), Max: 36.9 (09-13-24 @ 02:22)  HR: 68 (09-13-24 @ 06:13) (68 - 87)  BP: 123/78 (09-13-24 @ 06:13) (123/78 - 130/78)  RR: 18 (09-13-24 @ 06:13) (18 - 18)  SpO2: 97% (09-13-24 @ 06:13) (95% - 97%)    GA: NAD, A&O x 3  Pulm: no increased work of breathing  Abd: soft, nontender, nondistended, no rebound or guarding, uterus firm.  Extremities: no swelling or calf tenderness                          11.3   12.06 )-----------( 163      ( 12 Sep 2024 03:01 )             33.0     09-12    136  |  103  |  10  ----------------------------<  108<H>  3.9   |  24  |  0.59    Ca    7.3<L>      12 Sep 2024 03:01  Mg     6.7     09-12    TPro  6.1  /  Alb  3.2<L>  /  TBili  <0.2  /  DBili  x   /  AST  34  /  ALT  14  /  AlkPhos  135<H>  09-12    acetaminophen     Tablet .. 975 milliGRAM(s) Oral <User Schedule>  benzocaine 20%/menthol 0.5% Spray 1 Spray(s) Topical every 6 hours PRN  dibucaine 1% Ointment 1 Application(s) Topical every 6 hours PRN  diphenhydrAMINE 25 milliGRAM(s) Oral every 6 hours PRN  diphtheria/tetanus/pertussis (acellular) Vaccine (Adacel) 0.5 milliLiter(s) IntraMuscular once  hydrocortisone 1% Cream 1 Application(s) Topical every 6 hours PRN  ibuprofen  Tablet. 600 milliGRAM(s) Oral every 6 hours  influenza   Vaccine 0.5 milliLiter(s) IntraMuscular once  lanolin Ointment 1 Application(s) Topical every 6 hours PRN  magnesium hydroxide Suspension 30 milliLiter(s) Oral two times a day PRN  NIFEdipine XL 30 milliGRAM(s) Oral every 12 hours  oxyCODONE    IR 5 milliGRAM(s) Oral every 3 hours PRN  oxyCODONE    IR 5 milliGRAM(s) Oral once PRN  oxytocin Infusion 666 milliUNIT(s)/Min IV Continuous <Continuous>  oxytocin Infusion. 2 milliUNIT(s)/Min IV Continuous <Continuous>  pramoxine 1%/zinc 5% Cream 1 Application(s) Topical every 4 hours PRN  prenatal multivitamin 1 Tablet(s) Oral daily  simethicone 80 milliGRAM(s) Chew every 4 hours PRN  sodium chloride 0.9% lock flush 3 milliLiter(s) IV Push every 8 hours  witch hazel Pads 1 Application(s) Topical every 4 hours PRN

## 2024-09-13 NOTE — DISCHARGE NOTE OB - PLAN OF CARE
Please continue to take nifedipine 30mg every 12 hours. If your blood pressure is ever <110/<60 you may hold a dose of medication and recheck your blood pressure in 1-2 hrs; if the blood pressure has risen to >110/>60 you may take the medication. Do not take nifedipine if your heart rate is >110.    Please follow up with your OB within 1-2 weeks for a blood pressure check. Check blood pressures at home 3x a day. If your blood pressure is ever greater than 160/110, you develop a headache not relieved by tylenol, visual disturbances, or right upper abdominal pain, call your doctor or the hospital, or go to your nearest emergency room right away. Take Motrin 600mg every 6 hours and/or tylenol 650mg every 6 hours as needed for pain. Call your OB to schedule a follow up appointment in 1-2 weeks. Nothing per vagina until cleared by your OB - no intercourse, douching, tampons, etc.  Call your OB if you experience severe abdominal pain not improved by oral pain medications, heavy bright red vaginal bleeding saturating more than 1 pad per hour, or fever greater than 100.4F. Consider contraception options to be discussed with your OB.

## 2024-09-13 NOTE — DISCHARGE NOTE OB - CARE PLAN
Principal Discharge DX:	Postpartum state  Assessment and plan of treatment:	Take Motrin 600mg every 6 hours and/or tylenol 650mg every 6 hours as needed for pain. Call your OB to schedule a follow up appointment in 1-2 weeks. Nothing per vagina until cleared by your OB - no intercourse, douching, tampons, etc.  Call your OB if you experience severe abdominal pain not improved by oral pain medications, heavy bright red vaginal bleeding saturating more than 1 pad per hour, or fever greater than 100.4F. Consider contraception options to be discussed with your OB.  Secondary Diagnosis:	Preeclampsia  Assessment and plan of treatment:	Please continue to take nifedipine 30mg every 12 hours. If your blood pressure is ever <110/<60 you may hold a dose of medication and recheck your blood pressure in 1-2 hrs; if the blood pressure has risen to >110/>60 you may take the medication. Do not take nifedipine if your heart rate is >110.    Please follow up with your OB within 1-2 weeks for a blood pressure check. Check blood pressures at home 3x a day. If your blood pressure is ever greater than 160/110, you develop a headache not relieved by tylenol, visual disturbances, or right upper abdominal pain, call your doctor or the hospital, or go to your nearest emergency room right away.   1

## 2024-09-13 NOTE — DISCHARGE NOTE OB - PATIENT PORTAL LINK FT
You can access the FollowMyHealth Patient Portal offered by [facility] by registering at the following website: [email or web address]. By joining [employer]’s FollowMyHealth portal, you will also be able to view your health information using other applications (apps) compatible with our system.

## 2024-09-19 DIAGNOSIS — B08.4 ENTEROVIRAL VESICULAR STOMATITIS WITH EXANTHEM: ICD-10-CM

## 2024-09-19 DIAGNOSIS — Z28.09 IMMUNIZATION NOT CARRIED OUT BECAUSE OF OTHER CONTRAINDICATION: ICD-10-CM

## 2024-09-19 DIAGNOSIS — K21.9 GASTRO-ESOPHAGEAL REFLUX DISEASE WITHOUT ESOPHAGITIS: ICD-10-CM

## 2024-09-19 DIAGNOSIS — Z79.82 LONG TERM (CURRENT) USE OF ASPIRIN: ICD-10-CM

## 2024-09-19 DIAGNOSIS — Z28.21 IMMUNIZATION NOT CARRIED OUT BECAUSE OF PATIENT REFUSAL: ICD-10-CM

## 2024-09-19 DIAGNOSIS — Z3A.38 38 WEEKS GESTATION OF PREGNANCY: ICD-10-CM

## 2024-10-02 LAB — SURGICAL PATHOLOGY STUDY: SIGNIFICANT CHANGE UP
